# Patient Record
Sex: FEMALE | URBAN - METROPOLITAN AREA
[De-identification: names, ages, dates, MRNs, and addresses within clinical notes are randomized per-mention and may not be internally consistent; named-entity substitution may affect disease eponyms.]

---

## 2021-04-08 DIAGNOSIS — Z23 ENCOUNTER FOR IMMUNIZATION: ICD-10-CM

## 2023-01-31 ENCOUNTER — APPOINTMENT (RX ONLY)
Dept: URBAN - METROPOLITAN AREA CLINIC 94 | Facility: CLINIC | Age: 73
Setting detail: DERMATOLOGY
End: 2023-01-31

## 2023-01-31 DIAGNOSIS — Z86.007 PERSONAL HISTORY OF IN-SITU NEOPLASM OF SKIN: ICD-10-CM

## 2023-01-31 DIAGNOSIS — L57.0 ACTINIC KERATOSIS: ICD-10-CM

## 2023-01-31 PROCEDURE — ? COUNSELING

## 2023-01-31 PROCEDURE — 17000 DESTRUCT PREMALG LESION: CPT

## 2023-01-31 PROCEDURE — ? LIQUID NITROGEN

## 2023-01-31 PROCEDURE — 99212 OFFICE O/P EST SF 10 MIN: CPT | Mod: 25

## 2023-01-31 PROCEDURE — 17003 DESTRUCT PREMALG LES 2-14: CPT

## 2023-01-31 ASSESSMENT — LOCATION DETAILED DESCRIPTION DERM
LOCATION DETAILED: NASAL SUPRATIP
LOCATION DETAILED: LEFT ANTERIOR PROXIMAL UPPER ARM
LOCATION DETAILED: NASAL DORSUM

## 2023-01-31 ASSESSMENT — LOCATION ZONE DERM
LOCATION ZONE: ARM
LOCATION ZONE: NOSE

## 2023-01-31 ASSESSMENT — LOCATION SIMPLE DESCRIPTION DERM
LOCATION SIMPLE: LEFT UPPER ARM
LOCATION SIMPLE: NOSE

## 2023-01-31 NOTE — PROCEDURE: LIQUID NITROGEN
Number Of Freeze-Thaw Cycles: 2 freeze-thaw cycles
Render Post-Care Instructions In Note?: yes
Duration Of Freeze Thaw-Cycle (Seconds): 5
Post-Care Instructions: I reviewed with the patient in detail post-care instructions. Patient is to wear sunprotection, and avoid picking at any of the treated lesions. Pt may apply Vaseline to crusted or scabbing areas.
Consent: The patient's consent was obtained including but not limited to risks of crusting, scabbing, blistering, scarring, darker or lighter pigmentary change, recurrence, incomplete removal and infection.
Detail Level: Detailed
Render Note In Bullet Format When Appropriate: No

## 2025-05-01 ENCOUNTER — APPOINTMENT (OUTPATIENT)
Dept: URBAN - METROPOLITAN AREA CLINIC 94 | Facility: CLINIC | Age: 75
Setting detail: DERMATOLOGY
End: 2025-05-01

## 2025-05-01 DIAGNOSIS — L57.0 ACTINIC KERATOSIS: ICD-10-CM

## 2025-05-01 DIAGNOSIS — L82.0 INFLAMED SEBORRHEIC KERATOSIS: ICD-10-CM

## 2025-05-01 DIAGNOSIS — L85.3 XEROSIS CUTIS: ICD-10-CM

## 2025-05-01 DIAGNOSIS — L30.9 DERMATITIS, UNSPECIFIED: ICD-10-CM

## 2025-05-01 PROCEDURE — ? COUNSELING

## 2025-05-01 PROCEDURE — 17003 DESTRUCT PREMALG LES 2-14: CPT | Mod: 59

## 2025-05-01 PROCEDURE — 17110 DESTRUCTION B9 LES UP TO 14: CPT

## 2025-05-01 PROCEDURE — ? LIQUID NITROGEN

## 2025-05-01 PROCEDURE — ? PRESCRIPTION MEDICATION MANAGEMENT

## 2025-05-01 PROCEDURE — 99213 OFFICE O/P EST LOW 20 MIN: CPT | Mod: 25

## 2025-05-01 PROCEDURE — 17000 DESTRUCT PREMALG LESION: CPT | Mod: 59

## 2025-05-01 ASSESSMENT — LOCATION ZONE DERM
LOCATION ZONE: NOSE
LOCATION ZONE: LEG
LOCATION ZONE: FACE

## 2025-05-01 ASSESSMENT — LOCATION SIMPLE DESCRIPTION DERM
LOCATION SIMPLE: LEFT ANKLE
LOCATION SIMPLE: LEFT PRETIBIAL REGION
LOCATION SIMPLE: NOSE
LOCATION SIMPLE: LEFT CHEEK

## 2025-05-01 ASSESSMENT — LOCATION DETAILED DESCRIPTION DERM
LOCATION DETAILED: LEFT DISTAL PRETIBIAL REGION
LOCATION DETAILED: NASAL DORSUM
LOCATION DETAILED: NASAL SUPRATIP
LOCATION DETAILED: LEFT POSTERIOR ANKLE
LOCATION DETAILED: LEFT LATERAL MALAR CHEEK

## 2025-05-01 NOTE — PROCEDURE: LIQUID NITROGEN
Show Applicator Variable?: Yes
Consent: The patient's consent was obtained including but not limited to risks of crusting, scabbing, blistering, scarring, darker or lighter pigmentary change, recurrence, incomplete removal and infection.
Number Of Freeze-Thaw Cycles: 2 freeze-thaw cycles
Detail Level: Detailed
Post-Care Instructions: I reviewed with the patient in detail post-care instructions. Patient is to wear sunprotection, and avoid picking at any of the treated lesions. Pt may apply Vaseline to crusted or scabbing areas.
Render Note In Bullet Format When Appropriate: No
Duration Of Freeze Thaw-Cycle (Seconds): 5
Medical Necessity Information: It is in your best interest to select a reason for this procedure from the list below. All of these items fulfill various CMS LCD requirements except the new and changing color options.
Medical Necessity Clause: This procedure was medically necessary because the lesions that were treated were:
Spray Paint Text: The liquid nitrogen was applied to the skin utilizing a spray paint frosting technique.

## 2025-05-01 NOTE — PROCEDURE: PRESCRIPTION MEDICATION MANAGEMENT
Render In Strict Bullet Format?: No
Detail Level: Simple
Continue Regimen: Triamcinolone ointment (prescribed by another dr)\\nApply to a twice daily for two weeks on, one week off. Prn for flares

## 2025-05-18 PROBLEM — H35.3211 EXUDATIVE AGE-RELATED MACULAR DEGENERATION OF RIGHT EYE WITH ACTIVE CHOROIDAL NEOVASCULARIZATION (HCC): Status: ACTIVE | Noted: 2018-03-20

## 2025-05-18 RX ORDER — BUDESONIDE AND FORMOTEROL FUMARATE DIHYDRATE 160; 4.5 UG/1; UG/1
2 AEROSOL RESPIRATORY (INHALATION) 2 TIMES DAILY
COMMUNITY

## 2025-05-18 RX ORDER — LANSOPRAZOLE 30 MG/1
CAPSULE, DELAYED RELEASE ORAL
COMMUNITY
Start: 2025-05-07

## 2025-05-18 RX ORDER — ALPRAZOLAM 0.25 MG
0.25 TABLET ORAL 3 TIMES DAILY PRN
COMMUNITY
Start: 2025-01-15

## 2025-05-18 RX ORDER — FELODIPINE 5 MG/1
5 TABLET, EXTENDED RELEASE ORAL EVERY MORNING
COMMUNITY
Start: 2025-01-30

## 2025-05-18 RX ORDER — ALBUTEROL SULFATE 5 MG/ML
SOLUTION RESPIRATORY (INHALATION)
COMMUNITY
End: 2025-05-24

## 2025-05-18 RX ORDER — LOSARTAN POTASSIUM 50 MG/1
50 TABLET ORAL EVERY MORNING
COMMUNITY
Start: 2025-01-30

## 2025-05-18 RX ORDER — ALBUTEROL SULFATE 0.83 MG/ML
2.5 SOLUTION RESPIRATORY (INHALATION) 3 TIMES DAILY
COMMUNITY

## 2025-05-18 RX ORDER — ALBUTEROL SULFATE 90 UG/1
2 INHALANT RESPIRATORY (INHALATION)
COMMUNITY

## 2025-05-19 ENCOUNTER — OFFICE VISIT (OUTPATIENT)
Dept: INTERNAL MEDICINE CLINIC | Facility: CLINIC | Age: 75
End: 2025-05-19
Payer: MEDICARE

## 2025-05-19 VITALS
HEART RATE: 83 BPM | OXYGEN SATURATION: 96 % | DIASTOLIC BLOOD PRESSURE: 76 MMHG | TEMPERATURE: 98.7 F | WEIGHT: 153 LBS | HEIGHT: 62 IN | SYSTOLIC BLOOD PRESSURE: 122 MMHG | BODY MASS INDEX: 28.16 KG/M2

## 2025-05-19 DIAGNOSIS — U09.9 POST-COVID CHRONIC SHORTNESS OF BREATH: ICD-10-CM

## 2025-05-19 DIAGNOSIS — R06.02 POST-COVID CHRONIC SHORTNESS OF BREATH: ICD-10-CM

## 2025-05-19 DIAGNOSIS — F32.A ANXIETY AND DEPRESSION: ICD-10-CM

## 2025-05-19 DIAGNOSIS — E78.5 DYSLIPIDEMIA, GOAL LDL BELOW 130: ICD-10-CM

## 2025-05-19 DIAGNOSIS — Z12.11 COLON CANCER SCREENING: ICD-10-CM

## 2025-05-19 DIAGNOSIS — J45.40 MODERATE PERSISTENT ASTHMA WITHOUT COMPLICATION: Chronic | ICD-10-CM

## 2025-05-19 DIAGNOSIS — I10 HTN, GOAL BELOW 140/90: ICD-10-CM

## 2025-05-19 DIAGNOSIS — Z00.00 MEDICARE ANNUAL WELLNESS VISIT, SUBSEQUENT: Primary | ICD-10-CM

## 2025-05-19 DIAGNOSIS — H35.3211 EXUDATIVE AGE-RELATED MACULAR DEGENERATION OF RIGHT EYE WITH ACTIVE CHOROIDAL NEOVASCULARIZATION (HCC): ICD-10-CM

## 2025-05-19 DIAGNOSIS — R06.09 DOE (DYSPNEA ON EXERTION): ICD-10-CM

## 2025-05-19 DIAGNOSIS — G25.81 RESTLESS LEG: ICD-10-CM

## 2025-05-19 DIAGNOSIS — Z78.0 POST-MENOPAUSAL: ICD-10-CM

## 2025-05-19 DIAGNOSIS — R79.9 ABNORMAL FINDING OF BLOOD CHEMISTRY, UNSPECIFIED: ICD-10-CM

## 2025-05-19 DIAGNOSIS — Z13.1 DIABETES MELLITUS SCREENING: ICD-10-CM

## 2025-05-19 DIAGNOSIS — F41.9 ANXIETY AND DEPRESSION: ICD-10-CM

## 2025-05-19 PROCEDURE — G0439 PPPS, SUBSEQ VISIT: HCPCS | Performed by: INTERNAL MEDICINE

## 2025-05-19 PROCEDURE — 99204 OFFICE O/P NEW MOD 45 MIN: CPT | Performed by: INTERNAL MEDICINE

## 2025-05-19 RX ORDER — GABAPENTIN 100 MG/1
100 CAPSULE ORAL
Qty: 30 CAPSULE | Refills: 5 | Status: SHIPPED | OUTPATIENT
Start: 2025-05-19

## 2025-05-19 NOTE — PROGRESS NOTES
Name: Bre Staton      : 1950      MRN: [id number]  Encounter Provider: Braden Bernal MD  Encounter Date: 2025   Encounter department: [facility] INTERNAL MEDICINE [place]  :  Assessment & Plan  Medicare annual wellness visit, subsequent  Completed.       HTN, goal below 140/90    HTN - losartan, felodipine, bp stable, - pt denies CP, SOB, new onset of HA, vision change, dizziness, fever, chills     Orders:  •  CBC and differential; Future  •  Comprehensive metabolic panel; Future  •  TSH, 3rd generation with Free T4 reflex; Future    Dyslipidemia, goal LDL below 130  Dyslipidemia - pt declined medications this time, discussed risk         Orders:  •  Lipid Panel with Direct LDL reflex; Future    Moderate persistent asthma without complication  Asthma - albuterol, symbicort, use less than 1x/wk     Orders:  •  Ambulatory Referral to Pulmonology; Future    Exudative age-related macular degeneration of right eye with active choroidal neovascularization (HCC)  Macular degeneration - declining vision, follows retinal specialist          Post-COVID chronic shortness of breath  She had COVID , wasn't hospitalized, struggling with sob, walking from one room to the other, before COVID, she was walking her dog, her legs hurt her and her fingertips.  Orders:  •  CBC and differential; Future  •  Comprehensive metabolic panel; Future  •  TSH, 3rd generation with Free T4 reflex; Future  •  Complete PFT with post bronchodilator; Future  •  XR chest pa and lateral; Future  •  Ambulatory Referral to Pulmonology; Future    Diabetes mellitus screening  Check A1c.  Orders:  •  Hemoglobin A1C; Future    VILLAVICENCIO (dyspnea on exertion)  She had COVID , wasn't hospitalized, struggling with sob, walking from one room to the other, before COVID, denies cp, sob, palpitations. Heart exam RRR  Orders:  •  Echo complete w/ contrast if indicated; Future  •  XR chest pa and lateral; Future    Restless leg  Episodic, started a  month ago, not a skylar horse, just achy, constantly having to move it  Orders:  •  Iron Panel (Includes Ferritin, Iron Sat%, Iron, and TIBC); Future  •  gabapentin (NEURONTIN) 100 mg capsule; Take 1 capsule (100 mg total) by mouth daily at bedtime    Abnormal finding of blood chemistry, unspecified    Orders:  •  Iron Panel (Includes Ferritin, Iron Sat%, Iron, and TIBC); Future    Anxiety and depression  Depression Screening Follow-up Plan: Patient's depression screening was positive with a PHQ-2 score of 4. Their PHQ-9 score was 16. Patient assessed for underlying major depression. They have no active suicidal ideations. Brief counseling provided and recommend additional follow-up/re-evaluation next office visit.  Taking xanax as needed. Cannot take SSRI 2/2 eye disease.       Colon cancer screening  Last colonoscopy in 2015, normal.  Orders:  •  Cologuard    Post-menopausal    Orders:  •  DXA bone density spine hip and pelvis; Future      Depression Screening and Follow-up Plan: Patient's depression screening was positive with a PHQ-2 score of 4. Their PHQ-9 score was 16.   Clincally patient does not have depression. No treatment is required.     Preventive health issues were discussed with patient, and age appropriate screening tests were ordered as noted in patient's After Visit Summary. Personalized health advice and appropriate referrals for health education or preventive services given if needed, as noted in patient's After Visit Summary.    History of Present Illness     Here to establish care.     Patient Care Team:  Braden Bernal MD as PCP - General (Internal Medicine)    Review of Systems   Constitutional:  Negative for chills and fever.   HENT:  Negative for ear pain and sore throat.    Eyes:  Negative for pain and visual disturbance.   Respiratory:  Positive for shortness of breath. Negative for cough.    Cardiovascular:  Negative for chest pain and palpitations.   Gastrointestinal:  Negative for  abdominal pain and vomiting.   Genitourinary:  Negative for dysuria and hematuria.   Musculoskeletal:  Negative for arthralgias and back pain.   Skin:  Negative for color change and rash.   Neurological:  Negative for seizures and syncope.   All other systems reviewed and are negative.    Medical History Reviewed by provider this encounter:  Tobacco  Allergies  Meds  Problems  Med Hx  Surg Hx  Fam Hx       Annual Wellness Visit Questionnaire   Bre is here for her Subsequent Wellness visit. Last Medicare Wellness visit information reviewed, patient interviewed and updates made to the record today.      Health Risk Assessment:   Patient rates overall health as poor. Patient feels that their physical health rating is slightly worse. Patient is satisfied with their life. Eyesight was rated as much worse. Hearing was rated as same. Patient feels that their emotional and mental health rating is slightly worse. Patients states they are sometimes angry. Patient states they are often unusually tired/fatigued. Pain experienced in the last 7 days has been none. Patient states that she has experienced no weight loss or gain in last 6 months.     Depression Screening:   PHQ-2 Score: 4  PHQ-9 Score: 16      Fall Risk Screening:   In the past year, patient has experienced: no history of falling in past year      Urinary Incontinence Screening:   Patient has not leaked urine accidently in the last six months.     Home Safety:  Patient has trouble with stairs inside or outside of their home. Patient has working smoke alarms and has working carbon monoxide detector. Home safety hazards include: none.     Nutrition:   Current diet is Regular.     Medications:   Patient is not currently taking any over-the-counter supplements. Patient is able to manage medications.     Activities of Daily Living (ADLs)/Instrumental Activities of Daily Living (IADLs):   Walk and transfer into and out of bed and chair?: Yes  Dress and groom  yourself?: Yes    Bathe or shower yourself?: Yes    Feed yourself? Yes  Do your laundry/housekeeping?: Yes  Manage your money, pay your bills and track your expenses?: Yes  Make your own meals?: No    Do your own shopping?: No    Previous Hospitalizations:   Any hospitalizations or ED visits within the last 12 months?: No      Advance Care Planning:   Living will: Yes    Durable POA for healthcare: Yes    Advanced directive: Yes      Cognitive Screening:   Provider or family/friend/caregiver concerned regarding cognition?: No    Preventive Screenings      Cardiovascular Screening:    General: History Lipid Disorder and Risks and Benefits Discussed    Due for: Lipid Panel      Diabetes Screening:     General: Risks and Benefits Discussed    Due for: Blood Glucose      Colorectal Cancer Screening:     General: Risks and Benefits Discussed    Due for: Colonoscopy - Low Risk      Breast Cancer Screening:     General: Screening Current      Cervical Cancer Screening:    General: Screening Not Indicated      Osteoporosis Screening:    General: Risks and Benefits Discussed    Due for: DXA Axial      Abdominal Aortic Aneurysm (AAA) Screening:        General: Screening Not Indicated      Lung Cancer Screening:     General: Screening Not Indicated      Hepatitis C Screening:    General: Patient Declines    Screening, Brief Intervention, and Referral to Treatment (SBIRT)     Screening  Typical number of drinks in a day: 2  Typical number of drinks in a week: 14  Interpretation: Low risk drinking behavior.    Single Item Drug Screening:  How often have you used an illegal drug (including marijuana) or a prescription medication for non-medical reasons in the past year? never    Single Item Drug Screen Score: 0  Interpretation: Negative screen for possible drug use disorder    Brief Intervention  Alcohol & drug use screenings were reviewed. No concerns regarding substance use disorder identified.     Other Counseling Topics:  "  Car/seat belt/driving safety.     Social Drivers of Health     Food Insecurity: No Food Insecurity (5/19/2025)    Nursing - Inadequate Food Risk Classification    • Worried About Running Out of Food in the Last Year: Never true    • Ran Out of Food in the Last Year: Never true   Transportation Needs: No Transportation Needs (5/19/2025)    PRAPARE - Transportation    • Lack of Transportation (Medical): No    • Lack of Transportation (Non-Medical): No   Housing Stability: Low Risk  (5/19/2025)    Housing Stability Vital Sign    • Unable to Pay for Housing in the Last Year: No    • Number of Times Moved in the Last Year: 0    • Homeless in the Last Year: No   Utilities: Not At Risk (5/19/2025)    [facility] Utilities    • Threatened with loss of utilities: No     No results found.    Objective   /76 (BP Location: Left arm, Patient Position: Sitting, Cuff Size: Standard)   Pulse 83   Temp 98.7 °F (37.1 °C) (Temporal)   Ht 5' 2\" (1.575 m)   Wt 69.4 kg (153 lb)   SpO2 96%   BMI 27.98 kg/m²     Physical Exam  Vitals and nursing note reviewed.   Constitutional:       General: She is not in acute distress.     Appearance: Normal appearance. She is well-developed.   HENT:      Head: Normocephalic and atraumatic.     Cardiovascular:      Rate and Rhythm: Normal rate and regular rhythm.      Heart sounds: No murmur heard.  Pulmonary:      Effort: Pulmonary effort is normal. No respiratory distress.      Breath sounds: Normal breath sounds.   Abdominal:      General: Abdomen is flat.      Palpations: Abdomen is soft.      Tenderness: There is no abdominal tenderness.     Musculoskeletal:         General: No swelling.   Lymphadenopathy:      Cervical: No cervical adenopathy.     Skin:     General: Skin is warm and dry.      Capillary Refill: Capillary refill takes less than 2 seconds.     Neurological:      General: No focal deficit present.      Mental Status: She is alert and oriented to person, place, and time. Mental " status is at baseline.     Psychiatric:         Mood and Affect: Mood normal.

## 2025-05-19 NOTE — ASSESSMENT & PLAN NOTE
HTN - losartan, felodipine, bp stable, - pt denies CP, SOB, new onset of HA, vision change, dizziness, fever, chills     Orders:    CBC and differential; Future    Comprehensive metabolic panel; Future    TSH, 3rd generation with Free T4 reflex; Future

## 2025-05-19 NOTE — PATIENT INSTRUCTIONS
Medicare Preventive Visit Patient Instructions  Thank you for completing your Welcome to Medicare Visit or Medicare Annual Wellness Visit today. Your next wellness visit will be due in one year (5/20/2026).  The screening/preventive services that you may require over the next 5-10 years are detailed below. Some tests may not apply to you based off risk factors and/or age. Screening tests ordered at today's visit but not completed yet may show as past due. Also, please note that scanned in results may not display below.  Preventive Screenings:  Service Recommendations Previous Testing/Comments   Colorectal Cancer Screening  * Colonoscopy    * Fecal Occult Blood Test (FOBT)/Fecal Immunochemical Test (FIT)  * Fecal DNA/Cologuard Test  * Flexible Sigmoidoscopy Age: 45-75 years old   Colonoscopy: every 10 years (may be performed more frequently if at higher risk)  OR  FOBT/FIT: every 1 year  OR  Cologuard: every 3 years  OR  Sigmoidoscopy: every 5 years  Screening may be recommended earlier than age 45 if at higher risk for colorectal cancer. Also, an individualized decision between you and your healthcare provider will decide whether screening between the ages of 76-85 would be appropriate. Colonoscopy: Not on file  FOBT/FIT: Not on file  Cologuard: Not on file  Sigmoidoscopy: Not on file          Breast Cancer Screening Age: 40+ years old  Frequency: every 1-2 years  Not required if history of left and right mastectomy Mammogram: 10/14/2024    Screening Current   Cervical Cancer Screening Between the ages of 21-29, pap smear recommended once every 3 years.   Between the ages of 30-65, can perform pap smear with HPV co-testing every 5 years.   Recommendations may differ for women with a history of total hysterectomy, cervical cancer, or abnormal pap smears in past. Pap Smear: Not on file    Screening Not Indicated   Hepatitis C Screening Once for adults born between 1945 and 1965  More frequently in patients at high  risk for Hepatitis C Hep C Antibody: Not on file        Diabetes Screening 1-2 times per year if you're at risk for diabetes or have pre-diabetes Fasting glucose: No results in last 5 years (No results in last 5 years)  A1C: No results in last 5 years (No results in last 5 years)  Screening Current   Cholesterol Screening Once every 5 years if you don't have a lipid disorder. May order more often based on risk factors. Lipid panel: 06/16/2020    Screening Not Indicated  History Lipid Disorder     Other Preventive Screenings Covered by Medicare:  Abdominal Aortic Aneurysm (AAA) Screening: covered once if your at risk. You're considered to be at risk if you have a family history of AAA.  Lung Cancer Screening: covers low dose CT scan once per year if you meet all of the following conditions: (1) Age 55-77; (2) No signs or symptoms of lung cancer; (3) Current smoker or have quit smoking within the last 15 years; (4) You have a tobacco smoking history of at least 20 pack years (packs per day multiplied by number of years you smoked); (5) You get a written order from a healthcare provider.  Glaucoma Screening: covered annually if you're considered high risk: (1) You have diabetes OR (2) Family history of glaucoma OR (3)  aged 50 and older OR (4)  American aged 65 and older  Osteoporosis Screening: covered every 2 years if you meet one of the following conditions: (1) You're estrogen deficient and at risk for osteoporosis based off medical history and other findings; (2) Have a vertebral abnormality; (3) On glucocorticoid therapy for more than 3 months; (4) Have primary hyperparathyroidism; (5) On osteoporosis medications and need to assess response to drug therapy.   Last bone density test (DXA Scan): 10/08/2017.  HIV Screening: covered annually if you're between the age of 15-65. Also covered annually if you are younger than 15 and older than 65 with risk factors for HIV infection. For pregnant  patients, it is covered up to 3 times per pregnancy.    Immunizations:  Immunization Recommendations   Influenza Vaccine Annual influenza vaccination during flu season is recommended for all persons aged >= 6 months who do not have contraindications   Pneumococcal Vaccine   * Pneumococcal conjugate vaccine = PCV13 (Prevnar 13), PCV15 (Vaxneuvance), PCV20 (Prevnar 20)  * Pneumococcal polysaccharide vaccine = PPSV23 (Pneumovax) Adults 19-63 yo with certain risk factors or if 65+ yo  If never received any pneumonia vaccine: recommend Prevnar 20 (PCV20)  Give PCV20 if previously received 1 dose of PCV13 or PPSV23   Hepatitis B Vaccine 3 dose series if at intermediate or high risk (ex: diabetes, end stage renal disease, liver disease)   Respiratory syncytial virus (RSV) Vaccine - COVERED BY MEDICARE PART D  * RSVPreF3 (Arexvy) CDC recommends that adults 60 years of age and older may receive a single dose of RSV vaccine using shared clinical decision-making (SCDM)   Tetanus (Td) Vaccine - COST NOT COVERED BY MEDICARE PART B Following completion of primary series, a booster dose should be given every 10 years to maintain immunity against tetanus. Td may also be given as tetanus wound prophylaxis.   Tdap Vaccine - COST NOT COVERED BY MEDICARE PART B Recommended at least once for all adults. For pregnant patients, recommended with each pregnancy.   Shingles Vaccine (Shingrix) - COST NOT COVERED BY MEDICARE PART B  2 shot series recommended in those 19 years and older who have or will have weakened immune systems or those 50 years and older     Health Maintenance Due:      Topic Date Due   • Hepatitis C Screening  Never done   • Colorectal Cancer Screening  Never done   • Breast Cancer Screening: Mammogram  10/14/2025     Immunizations Due:      Topic Date Due   • COVID-19 Vaccine (8 - 2024-25 season) 03/19/2025     Advance Directives   What are advance directives?  Advance directives are legal documents that state your  wishes and plans for medical care. These plans are made ahead of time in case you lose your ability to make decisions for yourself. Advance directives can apply to any medical decision, such as the treatments you want, and if you want to donate organs.   What are the types of advance directives?  There are many types of advance directives, and each state has rules about how to use them. You may choose a combination of any of the following:  Living will:  This is a written record of the treatment you want. You can also choose which treatments you do not want, which to limit, and which to stop at a certain time. This includes surgery, medicine, IV fluid, and tube feedings.   Durable power of  for healthcare (DPAHC):  This is a written record that states who you want to make healthcare choices for you when you are unable to make them for yourself. This person, called a proxy, is usually a family member or a friend. You may choose more than 1 proxy.  Do not resuscitate (DNR) order:  A DNR order is used in case your heart stops beating or you stop breathing. It is a request not to have certain forms of treatment, such as CPR. A DNR order may be included in other types of advance directives.  Medical directive:  This covers the care that you want if you are in a coma, near death, or unable to make decisions for yourself. You can list the treatments you want for each condition. Treatment may include pain medicine, surgery, blood transfusions, dialysis, IV or tube feedings, and a ventilator (breathing machine).  Values history:  This document has questions about your views, beliefs, and how you feel and think about life. This information can help others choose the care that you would choose.  Why are advance directives important?  An advance directive helps you control your care. Although spoken wishes may be used, it is better to have your wishes written down. Spoken wishes can be misunderstood, or not followed.  Treatments may be given even if you do not want them. An advance directive may make it easier for your family to make difficult choices about your care.   Depression   Depression  is a medical condition that causes feelings of sadness or hopelessness that do not go away. Depression may cause you to lose interest in things you used to enjoy. These feelings may interfere with your daily life.  Call your local emergency number (911 in the ) if:   You think about harming yourself or someone else.  You have done something on purpose to hurt yourself.  The following resources are available at any time to help you, if needed:   National Suicide Prevention Lifeline: 1-620.931.7659 (0-605-117-TALK)   Suicide Hotline: 1-258.407.7096 (5-371-ZQISHQI)   For a list of international numbers: https://save.org/find-help/international-resources/  Treatment for depression may include medicine to relieve depression. Medicine is often used together with therapy. Therapy is a way for you to talk about your feelings and anything that may be causing depression. Therapy can be done alone or in a group. It may also be done with family members or a significant other.  Get regular physical activity.    Create a regular sleep schedule.    Eat a variety of healthy foods.    Do not drink alcohol or use drugs.     Weight Management   Why it is important to manage your weight:  Being overweight increases your risk of health conditions such as heart disease, high blood pressure, type 2 diabetes, and certain types of cancer. It can also increase your risk for osteoarthritis, sleep apnea, and other respiratory problems. Aim for a slow, steady weight loss. Even a small amount of weight loss can lower your risk of health problems.  How to lose weight safely:  A safe and healthy way to lose weight is to eat fewer calories and get regular exercise. You can lose up about 1 pound a week by decreasing the number of calories you eat by 500 calories each  day.   Healthy meal plan for weight management:  A healthy meal plan includes a variety of foods, contains fewer calories, and helps you stay healthy. A healthy meal plan includes the following:  Eat whole-grain foods more often.  A healthy meal plan should contain fiber. Fiber is the part of grains, fruits, and vegetables that is not broken down by your body. Whole-grain foods are healthy and provide extra fiber in your diet. Some examples of whole-grain foods are whole-wheat breads and pastas, oatmeal, brown rice, and bulgur.  Eat a variety of vegetables every day.  Include dark, leafy greens such as spinach, kale, preet greens, and mustard greens. Eat yellow and orange vegetables such as carrots, sweet potatoes, and winter squash.   Eat a variety of fruits every day.  Choose fresh or canned fruit (canned in its own juice or light syrup) instead of juice. Fruit juice has very little or no fiber.  Eat low-fat dairy foods.  Drink fat-free (skim) milk or 1% milk. Eat fat-free yogurt and low-fat cottage cheese. Try low-fat cheeses such as mozzarella and other reduced-fat cheeses.  Choose meat and other protein foods that are low in fat.  Choose beans or other legumes such as split peas or lentils. Choose fish, skinless poultry (chicken or turkey), or lean cuts of red meat (beef or pork). Before you cook meat or poultry, cut off any visible fat.   Use less fat and oil.  Try baking foods instead of frying them. Add less fat, such as margarine, sour cream, regular salad dressing and mayonnaise to foods. Eat fewer high-fat foods. Some examples of high-fat foods include french fries, doughnuts, ice cream, and cakes.  Eat fewer sweets.  Limit foods and drinks that are high in sugar. This includes candy, cookies, regular soda, and sweetened drinks.  Exercise:  Exercise at least 30 minutes per day on most days of the week. Some examples of exercise include walking, biking, dancing, and swimming. You can also fit in more  physical activity by taking the stairs instead of the elevator or parking farther away from stores. Ask your healthcare provider about the best exercise plan for you.    © Copyright [employer] 2018 Information is for End User's use only and may not be sold, redistributed or otherwise used for commercial purposes. All illustrations and images included in CareNotes® are the copyrighted property of [employer] or [employer]

## 2025-05-19 NOTE — ASSESSMENT & PLAN NOTE
Dyslipidemia - pt declined medications this time, discussed risk         Orders:    Lipid Panel with Direct LDL reflex; Future

## 2025-05-19 NOTE — ASSESSMENT & PLAN NOTE
Asthma - albuterol, symbicort, use less than 1x/wk     Orders:    Ambulatory Referral to Pulmonology; Future

## 2025-05-21 ENCOUNTER — RESULTS FOLLOW-UP (OUTPATIENT)
Dept: INTERNAL MEDICINE CLINIC | Facility: CLINIC | Age: 75
End: 2025-05-21

## 2025-05-21 ENCOUNTER — APPOINTMENT (INPATIENT)
Dept: CT IMAGING | Facility: HOSPITAL | Age: 75
DRG: 812 | End: 2025-05-21
Payer: MEDICARE

## 2025-05-21 ENCOUNTER — HOSPITAL ENCOUNTER (OUTPATIENT)
Dept: RADIOLOGY | Facility: HOSPITAL | Age: 75
Discharge: HOME | DRG: 812 | End: 2025-05-21
Payer: MEDICARE

## 2025-05-21 ENCOUNTER — HOSPITAL ENCOUNTER (INPATIENT)
Facility: HOSPITAL | Age: 75
LOS: 3 days | Discharge: HOME | DRG: 812 | End: 2025-05-24
Payer: MEDICARE

## 2025-05-21 ENCOUNTER — APPOINTMENT (OUTPATIENT)
Dept: LAB | Facility: HOSPITAL | Age: 75
DRG: 812 | End: 2025-05-21
Payer: MEDICARE

## 2025-05-21 DIAGNOSIS — U09.9 POST-COVID CHRONIC SHORTNESS OF BREATH: ICD-10-CM

## 2025-05-21 DIAGNOSIS — Z13.1 DIABETES MELLITUS SCREENING: ICD-10-CM

## 2025-05-21 DIAGNOSIS — G25.81 RESTLESS LEG: ICD-10-CM

## 2025-05-21 DIAGNOSIS — R06.02 POST-COVID CHRONIC SHORTNESS OF BREATH: ICD-10-CM

## 2025-05-21 DIAGNOSIS — R79.9 ABNORMAL FINDING OF BLOOD CHEMISTRY, UNSPECIFIED: ICD-10-CM

## 2025-05-21 DIAGNOSIS — E78.5 DYSLIPIDEMIA, GOAL LDL BELOW 130: ICD-10-CM

## 2025-05-21 DIAGNOSIS — D64.9 ANEMIA: Primary | ICD-10-CM

## 2025-05-21 DIAGNOSIS — R06.09 DOE (DYSPNEA ON EXERTION): ICD-10-CM

## 2025-05-21 DIAGNOSIS — I10 HTN, GOAL BELOW 140/90: ICD-10-CM

## 2025-05-21 PROBLEM — D75.839 THROMBOCYTOSIS: Status: ACTIVE | Noted: 2025-05-21

## 2025-05-21 PROBLEM — E87.1 HYPONATREMIA: Status: ACTIVE | Noted: 2025-05-21

## 2025-05-21 LAB
2HR DELTA HS TROPONIN: 0 NG/L
4HR DELTA HS TROPONIN: 9 NG/L
ABO GROUP BLD: NORMAL
ABO GROUP BLD: NORMAL
ALBUMIN SERPL BCG-MCNC: 4 G/DL (ref 3.5–5)
ALBUMIN SERPL BCG-MCNC: 4.2 G/DL (ref 3.5–5)
ALP SERPL-CCNC: 84 U/L (ref 34–104)
ALP SERPL-CCNC: 91 U/L (ref 34–104)
ALT SERPL W P-5'-P-CCNC: 16 U/L (ref 7–52)
ALT SERPL W P-5'-P-CCNC: 17 U/L (ref 7–52)
ANION GAP SERPL CALCULATED.3IONS-SCNC: 8 MMOL/L (ref 4–13)
ANION GAP SERPL CALCULATED.3IONS-SCNC: 9 MMOL/L (ref 4–13)
APTT PPP: 26 SECONDS (ref 23–34)
AST SERPL W P-5'-P-CCNC: 19 U/L (ref 13–39)
AST SERPL W P-5'-P-CCNC: 19 U/L (ref 13–39)
BACTERIA UR QL AUTO: NORMAL /HPF
BASOPHILS # BLD AUTO: 0.07 THOUSANDS/ÂΜL (ref 0–0.1)
BASOPHILS # BLD AUTO: 0.09 THOUSANDS/ÂΜL (ref 0–0.1)
BASOPHILS NFR BLD AUTO: 1 % (ref 0–1)
BASOPHILS NFR BLD AUTO: 1 % (ref 0–1)
BILIRUB SERPL-MCNC: 0.58 MG/DL (ref 0.2–1)
BILIRUB SERPL-MCNC: 0.62 MG/DL (ref 0.2–1)
BILIRUB UR QL STRIP: NEGATIVE
BLD GP AB SCN SERPL QL: NEGATIVE
BUN SERPL-MCNC: 7 MG/DL (ref 5–25)
BUN SERPL-MCNC: 7 MG/DL (ref 5–25)
CALCIUM SERPL-MCNC: 8.8 MG/DL (ref 8.4–10.2)
CALCIUM SERPL-MCNC: 9.1 MG/DL (ref 8.4–10.2)
CARDIAC TROPONIN I PNL SERPL HS: 15 NG/L (ref ?–50)
CARDIAC TROPONIN I PNL SERPL HS: 6 NG/L (ref ?–50)
CARDIAC TROPONIN I PNL SERPL HS: 6 NG/L (ref ?–50)
CHLORIDE SERPL-SCNC: 99 MMOL/L (ref 96–108)
CHLORIDE SERPL-SCNC: 99 MMOL/L (ref 96–108)
CHOLEST SERPL-MCNC: 170 MG/DL (ref ?–200)
CLARITY UR: CLEAR
CO2 SERPL-SCNC: 22 MMOL/L (ref 21–32)
CO2 SERPL-SCNC: 22 MMOL/L (ref 21–32)
COLOR UR: YELLOW
CREAT SERPL-MCNC: 0.43 MG/DL (ref 0.6–1.3)
CREAT SERPL-MCNC: 0.45 MG/DL (ref 0.6–1.3)
EOSINOPHIL # BLD AUTO: 0.2 THOUSAND/ÂΜL (ref 0–0.61)
EOSINOPHIL # BLD AUTO: 0.23 THOUSAND/ÂΜL (ref 0–0.61)
EOSINOPHIL NFR BLD AUTO: 2 % (ref 0–6)
EOSINOPHIL NFR BLD AUTO: 2 % (ref 0–6)
ERYTHROCYTE [DISTWIDTH] IN BLOOD BY AUTOMATED COUNT: 19.1 % (ref 11.6–15.1)
ERYTHROCYTE [DISTWIDTH] IN BLOOD BY AUTOMATED COUNT: 19.3 % (ref 11.6–15.1)
FERRITIN SERPL-MCNC: 7 NG/ML (ref 30–307)
GFR SERPL CREATININE-BSD FRML MDRD: 101 ML/MIN/1.73SQ M
GFR SERPL CREATININE-BSD FRML MDRD: 99 ML/MIN/1.73SQ M
GLUCOSE P FAST SERPL-MCNC: 99 MG/DL (ref 65–99)
GLUCOSE SERPL-MCNC: 103 MG/DL (ref 65–140)
GLUCOSE UR STRIP-MCNC: NEGATIVE MG/DL
HCT VFR BLD AUTO: 16.6 % (ref 34.8–46.1)
HCT VFR BLD AUTO: 17.8 % (ref 34.8–46.1)
HDLC SERPL-MCNC: 70 MG/DL
HGB BLD-MCNC: 4.7 G/DL (ref 11.5–15.4)
HGB BLD-MCNC: 4.9 G/DL (ref 11.5–15.4)
HGB BLD-MCNC: 7.8 G/DL (ref 11.5–15.4)
HGB UR QL STRIP.AUTO: NEGATIVE
IMM GRANULOCYTES # BLD AUTO: 0.06 THOUSAND/UL (ref 0–0.2)
IMM GRANULOCYTES # BLD AUTO: 0.06 THOUSAND/UL (ref 0–0.2)
IMM GRANULOCYTES NFR BLD AUTO: 1 % (ref 0–2)
IMM GRANULOCYTES NFR BLD AUTO: 1 % (ref 0–2)
INR PPP: 1.1 (ref 0.85–1.19)
IRON SERPL-MCNC: <10 UG/DL (ref 50–212)
KETONES UR STRIP-MCNC: NEGATIVE MG/DL
LACTATE SERPL-SCNC: 1.5 MMOL/L (ref 0.5–2)
LDH SERPL-CCNC: 106 U/L (ref 140–271)
LDLC SERPL CALC-MCNC: 85 MG/DL (ref 0–100)
LEUKOCYTE ESTERASE UR QL STRIP: ABNORMAL
LYMPHOCYTES # BLD AUTO: 1.07 THOUSANDS/ÂΜL (ref 0.6–4.47)
LYMPHOCYTES # BLD AUTO: 1.52 THOUSANDS/ÂΜL (ref 0.6–4.47)
LYMPHOCYTES NFR BLD AUTO: 11 % (ref 14–44)
LYMPHOCYTES NFR BLD AUTO: 15 % (ref 14–44)
MAGNESIUM SERPL-MCNC: 2 MG/DL (ref 1.9–2.7)
MCH RBC QN AUTO: 19.7 PG (ref 26.8–34.3)
MCH RBC QN AUTO: 19.8 PG (ref 26.8–34.3)
MCHC RBC AUTO-ENTMCNC: 26.7 G/DL (ref 31.4–37.4)
MCHC RBC AUTO-ENTMCNC: 27 G/DL (ref 31.4–37.4)
MCV RBC AUTO: 74 FL (ref 82–98)
MCV RBC AUTO: 74 FL (ref 82–98)
MONOCYTES # BLD AUTO: 1.03 THOUSAND/ÂΜL (ref 0.17–1.22)
MONOCYTES # BLD AUTO: 1.15 THOUSAND/ÂΜL (ref 0.17–1.22)
MONOCYTES NFR BLD AUTO: 11 % (ref 4–12)
MONOCYTES NFR BLD AUTO: 11 % (ref 4–12)
NEUTROPHILS # BLD AUTO: 6.92 THOUSANDS/ÂΜL (ref 1.85–7.62)
NEUTROPHILS # BLD AUTO: 7.34 THOUSANDS/ÂΜL (ref 1.85–7.62)
NEUTS SEG NFR BLD AUTO: 70 % (ref 43–75)
NEUTS SEG NFR BLD AUTO: 74 % (ref 43–75)
NITRITE UR QL STRIP: NEGATIVE
NON-SQ EPI CELLS URNS QL MICRO: NORMAL /HPF
NRBC BLD AUTO-RTO: 0 /100 WBCS
NRBC BLD AUTO-RTO: 0 /100 WBCS
PH UR STRIP.AUTO: 6 [PH]
PLATELET # BLD AUTO: 486 THOUSANDS/UL (ref 149–390)
PLATELET # BLD AUTO: 501 THOUSANDS/UL (ref 149–390)
PMV BLD AUTO: 8.7 FL (ref 8.9–12.7)
PMV BLD AUTO: 9.1 FL (ref 8.9–12.7)
POTASSIUM SERPL-SCNC: 3.7 MMOL/L (ref 3.5–5.3)
POTASSIUM SERPL-SCNC: 4 MMOL/L (ref 3.5–5.3)
PROT SERPL-MCNC: 6.6 G/DL (ref 6.4–8.4)
PROT SERPL-MCNC: 7 G/DL (ref 6.4–8.4)
PROT UR STRIP-MCNC: NEGATIVE MG/DL
PROTHROMBIN TIME: 14.9 SECONDS (ref 12.3–15)
RBC # BLD AUTO: 2.33 MILLION/UL (ref 3.81–5.12)
RBC # BLD AUTO: 2.42 MILLION/UL (ref 3.81–5.12)
RBC #/AREA URNS AUTO: NORMAL /HPF
RETICS # AUTO: ABNORMAL 10*3/UL (ref 14097–95744)
RETICS # CALC: 3.23 % (ref 0.37–1.87)
RH BLD: NEGATIVE
RH BLD: NEGATIVE
SODIUM SERPL-SCNC: 129 MMOL/L (ref 135–147)
SODIUM SERPL-SCNC: 130 MMOL/L (ref 135–147)
SP GR UR STRIP.AUTO: <=1.005 (ref 1–1.03)
SPECIMEN EXPIRATION DATE: NORMAL
TIBC SERPL-MCNC: 567 UG/DL (ref 250–450)
TRANSFERRIN SERPL-MCNC: 405 MG/DL (ref 203–362)
TRIGL SERPL-MCNC: 73 MG/DL (ref ?–150)
TSH SERPL DL<=0.05 MIU/L-ACNC: 3.34 UIU/ML (ref 0.45–4.5)
UROBILINOGEN UR QL STRIP.AUTO: 0.2 E.U./DL
VIT B12 SERPL-MCNC: 330 PG/ML (ref 180–914)
WBC # BLD AUTO: 10.34 THOUSAND/UL (ref 4.31–10.16)
WBC # BLD AUTO: 9.4 THOUSAND/UL (ref 4.31–10.16)
WBC #/AREA URNS AUTO: NORMAL /HPF

## 2025-05-21 PROCEDURE — 82728 ASSAY OF FERRITIN: CPT

## 2025-05-21 PROCEDURE — P9016 RBC LEUKOCYTES REDUCED: HCPCS

## 2025-05-21 PROCEDURE — 86923 COMPATIBILITY TEST ELECTRIC: CPT

## 2025-05-21 PROCEDURE — 83540 ASSAY OF IRON: CPT

## 2025-05-21 PROCEDURE — 84443 ASSAY THYROID STIM HORMONE: CPT

## 2025-05-21 PROCEDURE — 86850 RBC ANTIBODY SCREEN: CPT

## 2025-05-21 PROCEDURE — 71046 X-RAY EXAM CHEST 2 VIEWS: CPT

## 2025-05-21 PROCEDURE — 85610 PROTHROMBIN TIME: CPT

## 2025-05-21 PROCEDURE — 86901 BLOOD TYPING SEROLOGIC RH(D): CPT

## 2025-05-21 PROCEDURE — 36415 COLL VENOUS BLD VENIPUNCTURE: CPT

## 2025-05-21 PROCEDURE — 83010 ASSAY OF HAPTOGLOBIN QUANT: CPT

## 2025-05-21 PROCEDURE — 81001 URINALYSIS AUTO W/SCOPE: CPT

## 2025-05-21 PROCEDURE — 74177 CT ABD & PELVIS W/CONTRAST: CPT

## 2025-05-21 PROCEDURE — 83615 LACTATE (LD) (LDH) ENZYME: CPT

## 2025-05-21 PROCEDURE — 83605 ASSAY OF LACTIC ACID: CPT

## 2025-05-21 PROCEDURE — 83550 IRON BINDING TEST: CPT

## 2025-05-21 PROCEDURE — 80053 COMPREHEN METABOLIC PANEL: CPT

## 2025-05-21 PROCEDURE — 85025 COMPLETE CBC W/AUTO DIFF WBC: CPT

## 2025-05-21 PROCEDURE — 82607 VITAMIN B-12: CPT

## 2025-05-21 PROCEDURE — 99284 EMERGENCY DEPT VISIT MOD MDM: CPT

## 2025-05-21 PROCEDURE — 93005 ELECTROCARDIOGRAM TRACING: CPT

## 2025-05-21 PROCEDURE — 83036 HEMOGLOBIN GLYCOSYLATED A1C: CPT

## 2025-05-21 PROCEDURE — 30233N1 TRANSFUSION OF NONAUTOLOGOUS RED BLOOD CELLS INTO PERIPHERAL VEIN, PERCUTANEOUS APPROACH: ICD-10-PCS

## 2025-05-21 PROCEDURE — 85045 AUTOMATED RETICULOCYTE COUNT: CPT

## 2025-05-21 PROCEDURE — 85018 HEMOGLOBIN: CPT

## 2025-05-21 PROCEDURE — 36430 TRANSFUSION BLD/BLD COMPNT: CPT

## 2025-05-21 PROCEDURE — 99291 CRITICAL CARE FIRST HOUR: CPT

## 2025-05-21 PROCEDURE — 85730 THROMBOPLASTIN TIME PARTIAL: CPT

## 2025-05-21 PROCEDURE — 80061 LIPID PANEL: CPT

## 2025-05-21 PROCEDURE — 86900 BLOOD TYPING SEROLOGIC ABO: CPT

## 2025-05-21 PROCEDURE — 84484 ASSAY OF TROPONIN QUANT: CPT

## 2025-05-21 PROCEDURE — 83735 ASSAY OF MAGNESIUM: CPT

## 2025-05-21 PROCEDURE — 99223 1ST HOSP IP/OBS HIGH 75: CPT

## 2025-05-21 RX ORDER — FELODIPINE 2.5 MG/1
5 TABLET, EXTENDED RELEASE ORAL EVERY MORNING
Status: DISCONTINUED | OUTPATIENT
Start: 2025-05-22 | End: 2025-05-24 | Stop reason: HOSPADM

## 2025-05-21 RX ORDER — PANTOPRAZOLE SODIUM 40 MG/10ML
40 INJECTION, POWDER, LYOPHILIZED, FOR SOLUTION INTRAVENOUS EVERY 12 HOURS SCHEDULED
Status: DISCONTINUED | OUTPATIENT
Start: 2025-05-21 | End: 2025-05-24 | Stop reason: HOSPADM

## 2025-05-21 RX ORDER — ALPRAZOLAM 0.25 MG
0.25 TABLET ORAL 3 TIMES DAILY PRN
Status: DISCONTINUED | OUTPATIENT
Start: 2025-05-21 | End: 2025-05-24 | Stop reason: HOSPADM

## 2025-05-21 RX ORDER — BUDESONIDE AND FORMOTEROL FUMARATE DIHYDRATE 160; 4.5 UG/1; UG/1
2 AEROSOL RESPIRATORY (INHALATION) 2 TIMES DAILY
Status: DISCONTINUED | OUTPATIENT
Start: 2025-05-21 | End: 2025-05-24 | Stop reason: HOSPADM

## 2025-05-21 RX ORDER — LOSARTAN POTASSIUM 50 MG/1
50 TABLET ORAL EVERY MORNING
Status: DISCONTINUED | OUTPATIENT
Start: 2025-05-22 | End: 2025-05-24 | Stop reason: HOSPADM

## 2025-05-21 RX ORDER — GABAPENTIN 100 MG/1
100 CAPSULE ORAL
Status: DISCONTINUED | OUTPATIENT
Start: 2025-05-21 | End: 2025-05-24 | Stop reason: HOSPADM

## 2025-05-21 RX ADMIN — GABAPENTIN 100 MG: 100 CAPSULE ORAL at 23:06

## 2025-05-21 RX ADMIN — ALPRAZOLAM 0.25 MG: 0.25 TABLET ORAL at 20:57

## 2025-05-21 RX ADMIN — IOHEXOL 100 ML: 350 INJECTION, SOLUTION INTRAVENOUS at 21:07

## 2025-05-21 RX ADMIN — PANTOPRAZOLE SODIUM 40 MG: 40 INJECTION, POWDER, FOR SOLUTION INTRAVENOUS at 23:42

## 2025-05-21 NOTE — ASSESSMENT & PLAN NOTE
Hyponatremia with sodium 130.  Unsure if it is in the setting of poor oral intake  Reassess BMP tomorrow after blood transfusion.

## 2025-05-21 NOTE — ASSESSMENT & PLAN NOTE
Does not appear to be in acute exacerbation.  Recommended as needed albuterol for shortness of breath.

## 2025-05-21 NOTE — ASSESSMENT & PLAN NOTE
Patient sent to the ED by her PCP due to critical lab work.  Hemoglobin 4.9.  Last year hemoglobin was 13.   patient also admits significant fatigue, shortness of breath, leg cramping and ice craving.  Denies GI bleed however patient also with macular degeneration and does not have a great vision.  Patient with no known history of GI bleed, ulcers, colonic mass.  Last colonoscopy 5/2015 and per patient it was normal.  Denies abdominal pain or weight loss however patient admits having constipation.  Received 2 units of PRBC in the ED  Suspicion for GI source of bleeding.  Possible lower GI bleed rather than upper Gi bleed.  Plan:  Reassess hemoglobin after 2 units of PRBC and continue transfusion if needed.  Pending iron panel and the rest of workup for anemia.  Consider transfusing Venofer if needed.  GI team consulted  Patient will be made NPO after midnight in anticipation of possible GI intervention.  Started PPI every 12 hours for the possible upper GI involvement.  Will obtain a CT abdomen and pelvis to rule out intra-abdominal pathology such as tumor.

## 2025-05-21 NOTE — PLAN OF CARE
Problem: HEMATOLOGIC - ADULT  Goal: Maintains hematologic stability  Description: INTERVENTIONS  - Assess for signs and symptoms of bleeding or hemorrhage  - Monitor labs  - Administer supportive blood products/factors as ordered and appropriate  Outcome: Progressing     Problem: INFECTION - ADULT  Goal: Absence or prevention of progression during hospitalization  Description: INTERVENTIONS:  - Assess and monitor for signs and symptoms of infection  - Monitor lab/diagnostic results  - Monitor all insertion sites, i.e. indwelling lines, tubes, and drains  - Monitor endotracheal if appropriate and nasal secretions for changes in amount and color  - [place] appropriate cooling/warming therapies per order  - Administer medications as ordered  - Instruct and encourage patient and family to use good hand hygiene technique  - Identify and instruct in appropriate isolation precautions for identified infection/condition  Outcome: Progressing     Problem: PAIN - ADULT  Goal: Verbalizes/displays adequate comfort level or baseline comfort level  Description: Interventions:  - Encourage patient to monitor pain and request assistance  - Assess pain using appropriate pain scale  - Administer analgesics as ordered based on type and severity of pain and evaluate response  - Implement non-pharmacological measures as appropriate and evaluate response  - Consider cultural and social influences on pain and pain management  - Notify physician/advanced practitioner if interventions unsuccessful or patient reports new pain  - Educate patient/family on pain management process including their role and importance of  reporting pain   - Provide non-pharmacologic/complimentary pain relief interventions  Outcome: Progressing     Problem: SAFETY ADULT  Goal: Patient will remain free of falls  Description: INTERVENTIONS:  - Educate patient/family on patient safety including physical limitations  - Instruct patient to call for assistance with  activity   - Consider consulting OT/PT to assist with strengthening/mobility based on AM PAC & JH-HLM score  - Consult OT/PT to assist with strengthening/mobility   - Keep Call bell within reach  - Keep bed low and locked with side rails adjusted as appropriate  - Keep care items and personal belongings within reach  - Initiate and maintain comfort rounds    Problem: Knowledge Deficit  Goal: Patient/family/caregiver demonstrates understanding of disease process, treatment plan, medications, and discharge instructions  Description: Complete learning assessment and assess knowledge base.  Interventions:  - Provide teaching at level of understanding  - Provide teaching via preferred learning methods  Outcome: Progressing     Problem: DISCHARGE PLANNING  Goal: Discharge to home or other facility with appropriate resources  Description: INTERVENTIONS:  - Identify barriers to discharge w/patient and caregiver  - Arrange for needed discharge resources and transportation as appropriate  - Identify discharge learning needs (meds, wound care, etc.)  - Arrange for interpretive services to assist at discharge as needed  - Refer to Case Management Department for coordinating discharge planning if the patient needs post-hospital services based on physician/advanced practitioner order or complex needs related to functional status, cognitive ability, or social support system  Outcome: Progressing   - Consider moving patient to room near nurses station  Outcome: Progressing

## 2025-05-21 NOTE — H&P
H&P - Hospitalist   Name: Bre Staton 75 y.o. female I MRN: [id number]  Unit/Bed#: ED 07 I Date of Admission: 5/21/2025   Date of Service: 5/21/2025 I Hospital Day: 0     Assessment & Plan  Acute anemia  Patient sent to the ED by her PCP due to critical lab work.  Hemoglobin 4.9.  Last year hemoglobin was 13.   patient also admits significant fatigue, shortness of breath, leg cramping and ice craving.  Denies GI bleed however patient also with macular degeneration and does not have a great vision.  Patient with no known history of GI bleed, ulcers, colonic mass.  Last colonoscopy 5/2015 and per patient it was normal.  Denies abdominal pain or weight loss however patient admits having constipation.  Received 2 units of PRBC in the ED  Suspicion for GI source of bleeding.  Possible lower GI bleed rather than upper Gi bleed.  Plan:  Reassess hemoglobin after 2 units of PRBC and continue transfusion if needed.  Pending iron panel and the rest of workup for anemia.  Consider transfusing Venofer if needed.  GI team consulted  Patient will be made NPO after midnight in anticipation of possible GI intervention.  Started PPI every 12 hours for the possible upper GI involvement.  Will obtain a CT abdomen and pelvis to rule out intra-abdominal pathology such as tumor.    Asthma, moderate persistent  Does not appear to be in acute exacerbation.  Recommended as needed albuterol for shortness of breath.  Exudative age-related macular degeneration of right eye with active choroidal neovascularization (HCC)  Follows with ophthalmology team and receives injection.  Essential hypertension  Patient on felodipine 5 mg daily and losartan 50 mg daily.  Continue home dose  Thrombocytosis  Thrombocytosis likely reactive from anemia.  Continue with management for acute anemia  Hyponatremia  Hyponatremia with sodium 130.  Unsure if it is in the setting of poor oral intake  Reassess BMP tomorrow after blood transfusion.      VTE  Pharmacologic Prophylaxis:   Moderate Risk (Score 3-4) - Pharmacological DVT Prophylaxis Contraindicated. Sequential Compression Devices Ordered.  Code Status: Level 1 - Full Code patient  Discussion with family: Updated  () at bedside.    Anticipated Length of Stay: Patient will be admitted on an inpatient basis with an anticipated length of stay of greater than 2 midnights secondary to significant anemia.    History of Present Illness   Chief Complaint: Shortness of breath, fatigue, abnormal blood work    Bre Staton is a 75 y.o. female with a PMH of asthma, hypertension, hyperlipidemia who presents with ongoing shortness of breath, fatigue.  Patient saw her PCP 2 days ago who recommended routine blood work.  She was noted to have critical result with hemoglobin 4.9 and was sent to the ED. Patient admits that her symptoms such as shortness of breath and fatigue started since COVID-19 infection that she had in February.  Denies being on blood thinners.  Patient admits having constipation for which she has been taking as needed laxatives.  She reports her stool consistency being regular however not sure if she has dark or bloody stools.  Patient overall hemodynamically stable.  No signs of active bleeding.  Admitted for further workup for acute anemia.    Review of Systems   Constitutional:  Positive for fatigue. Negative for chills and fever.   HENT:  Negative for ear pain and sore throat.    Eyes:  Negative for pain and visual disturbance.   Respiratory:  Positive for shortness of breath. Negative for cough.    Cardiovascular:  Negative for chest pain and palpitations.   Gastrointestinal:  Negative for abdominal pain and vomiting.   Genitourinary:  Negative for dysuria and hematuria.   Musculoskeletal:  Positive for myalgias. Negative for arthralgias and back pain.   Skin:  Positive for pallor. Negative for color change and rash.   Neurological:  Negative for seizures and syncope.   All other  systems reviewed and are negative.      Historical Information   Past Medical History[1]  Past Surgical History[2]  Social History[3]  E-Cigarette/Vaping    E-Cigarette Use Never User      E-Cigarette/Vaping Substances    Nicotine No     THC No     CBD No     Flavoring No     Other No     Unknown No        Social History:  Marital Status: /Civil Union   Occupation:   Patient Pre-hospital Living Situation: Home  Patient Pre-hospital Level of Mobility: walks  Patient Pre-hospital Diet Restrictions: none    Meds/Allergies   I have reviewed home medications with patient personally.  Prior to Admission medications    Medication Sig Start Date End Date Taking? Authorizing Provider   ALPRAZolam (XANAX) 0.25 mg tablet Take 0.25 mg by mouth 3 (three) times a day as needed for anxiety 1/15/25  Yes Historical Provider, MD   budesonide-formoterol (Symbicort) 160-4.5 mcg/act inhaler Inhale 2 puffs 2 (two) times a day   Yes Historical Provider, MD   felodipine (PLENDIL) 5 mg 24 hr tablet Take 5 mg by mouth every morning 1/30/25  Yes Historical Provider, MD   gabapentin (NEURONTIN) 100 mg capsule Take 1 capsule (100 mg total) by mouth daily at bedtime 5/19/25  Yes Braden Bernal MD   lansoprazole (PREVACID) 30 mg capsule TAKE 1 CAPSULE BY MOUTH EVERY DAY IN THE MORNING BEFORE A MEAL 5/7/25  Yes Historical Provider, MD   losartan (COZAAR) 50 mg tablet Take 50 mg by mouth every morning 1/30/25  Yes Historical Provider, MD   Omega-3 Fatty Acids (Fish Oil) 300 MG CAPS Take 1 tablet by mouth every morning   Yes Historical Provider, MD   albuterol (2.5 mg/3 mL) 0.083 % nebulizer solution Inhale 2.5 mg in the morning and 2.5 mg at noon and 2.5 mg in the evening.    Historical Provider, MD   albuterol (Albuterol Sulfate) (5 mg/mL) 0.5 % nebulizer solution Inhale    Historical Provider, MD   albuterol (PROVENTIL HFA,VENTOLIN HFA) 90 mcg/act inhaler Inhale 2 puffs    Historical Provider, MD   Albuterol Sulfate 108 (90 Base)  MCG/ACT AEPB Inhale 2 puffs    Historical Provider, MD     Allergies   Allergen Reactions    Azithromycin Rash     States does work for pt.       Objective :  Temp:  [98.2 °F (36.8 °C)-98.6 °F (37 °C)] 98.3 °F (36.8 °C)  HR:  [78-87] 78  BP: (130-158)/(70-78) 153/78  Resp:  [16-22] 17  SpO2:  [94 %-100 %] 94 %  O2 Device: None (Room air)    Physical Exam  Vitals and nursing note reviewed.   Constitutional:       General: She is not in acute distress.     Appearance: She is well-developed.   HENT:      Head: Normocephalic and atraumatic.     Eyes:      Conjunctiva/sclera: Conjunctivae normal.       Cardiovascular:      Rate and Rhythm: Normal rate and regular rhythm.      Heart sounds: No murmur heard.  Pulmonary:      Effort: Pulmonary effort is normal. No respiratory distress.      Breath sounds: Normal breath sounds. No wheezing or rales.   Abdominal:      Palpations: Abdomen is soft.      Tenderness: There is no abdominal tenderness.     Musculoskeletal:         General: No swelling.      Cervical back: Neck supple.      Right lower leg: No edema.      Left lower leg: No edema.     Skin:     General: Skin is warm and dry.      Capillary Refill: Capillary refill takes less than 2 seconds.      Coloration: Skin is pale.     Neurological:      Mental Status: She is alert.     Psychiatric:         Mood and Affect: Mood normal.          Lines/Drains:            Lab Results: I have reviewed the following results:  Results from last 7 days   Lab Units 05/21/25  1423   WBC Thousand/uL 10.34*   HEMOGLOBIN g/dL 4.9*   HEMATOCRIT % 17.8*   PLATELETS Thousands/uL 501*   SEGS PCT % 70   LYMPHO PCT % 15   MONO PCT % 11   EOS PCT % 2     Results from last 7 days   Lab Units 05/21/25  1423   SODIUM mmol/L 130*   POTASSIUM mmol/L 3.7   CHLORIDE mmol/L 99   CO2 mmol/L 22   BUN mg/dL 7   CREATININE mg/dL 0.45*   ANION GAP mmol/L 9   CALCIUM mg/dL 9.1   ALBUMIN g/dL 4.2   TOTAL BILIRUBIN mg/dL 0.62   ALK PHOS U/L 91   ALT U/L 17  "  AST U/L 19   GLUCOSE RANDOM mg/dL 103     Results from last 7 days   Lab Units 05/21/25  1423   INR  1.10         No results found for: \"HGBA1C\"  Results from last 7 days   Lab Units 05/21/25  1433   LACTIC ACID mmol/L 1.5       Imaging Results Review: I reviewed radiology reports from this admission including: chest xray.  Other Study Results Review: No additional pertinent studies reviewed.    Administrative Statements       ** Please Note: This note has been constructed using a voice recognition system. **         [1]   Past Medical History:  Diagnosis Date    Anxiety     Arthritis     Asthma     GERD (gastroesophageal reflux disease)     Substance abuse (HCC)     Visual impairment     macular degeneration   [2] No past surgical history on file.  [3]   Social History  Tobacco Use    Smoking status: Never    Smokeless tobacco: Never   Vaping Use    Vaping status: Never Used   Substance and Sexual Activity    Alcohol use: Yes     Alcohol/week: 14.0 standard drinks of alcohol     Types: 14 Glasses of wine per week    Drug use: Not Currently    Sexual activity: Not Currently     "

## 2025-05-21 NOTE — ED PROVIDER NOTES
Time reflects when diagnosis was documented in both MDM as applicable and the Disposition within this note       Time User Action Codes Description Comment    5/21/2025  3:58 PM Charlie Calix Add [D64.9] Anemia           ED Disposition       ED Disposition   Admit    Condition   Stable    Date/Time   Wed May 21, 2025  3:58 PM    Comment   Case was discussed with armando and the patient's admission status was agreed to be Admission Status: inpatient status to the service of Dr. Oliveira.               Assessment & Plan       Medical Decision Making  Unclear etiology.  Does have a microcytic anemia it appears.  Differential is broad at this time.  Will obtain labs to look for exact cause.  Will perform a iFOBT.  Will require blood transfusion and likely admission.    Patient states that she is scheduled to do Cologuard last colonoscopy was approximately 10 years ago.    Unclear etiology still.  At this time will admit to [facility] for further management and transfusion.    Amount and/or Complexity of Data Reviewed  Labs: ordered. Decision-making details documented in ED Course.  ECG/medicine tests:  Decision-making details documented in ED Course.    Risk  Decision regarding hospitalization.    Critical Care Time Statement: Upon my evaluation, this patient had a high probability of imminent or life-threatening deterioration due to severe symptomatic anemia, which required my direct attention, intervention, and personal management.  I spent a total of 33 minutes directly providing critical care services, including interpretation of complex medical databases, evaluating for the presence of life-threatening injuries or illnesses, complex medical decision making (to support/prevent further life-threatening deterioration)., and interpretation of hemodynamic data. This time is exclusive of procedures, teaching, treating other patients, family meetings, and any prior time recorded by providers other than myself.        ED Course as of  05/21/25 1602   Wed May 21, 2025   1430 ECG 12 lead  Sinus rhythm, normal axis, normal intervals, no ST elevations or depressions.  Subtle ST flattening in lead III.  Otherwise normal EKG.  As interpreted by me   1448 Reticulocytes Percent(!): 3.23   1455 LD (LDH)(!): 106   1456 hs TnI 0hr: 6   1501 Hemoglobin(!!): 4.9  Plan to transfuse 2 units       Medications - No data to display    ED Risk Strat Scores                    No data recorded                            History of Present Illness       Chief Complaint   Patient presents with    Evaluation of Abnormal Diagnostic Test     HGB  4.7  per recent labs. C/o  fatigue, states this started after she had covid.        Past Medical History:   Diagnosis Date    Anxiety     Arthritis     Asthma     GERD (gastroesophageal reflux disease)     Substance abuse (HCC)     Visual impairment     macular degeneration      No past surgical history on file.   No family history on file.   Social History[1]   E-Cigarette/Vaping    E-Cigarette Use Never User       E-Cigarette/Vaping Substances    Nicotine No     THC No     CBD No     Flavoring No     Other No     Unknown No       I have reviewed and agree with the history as documented.     75-year-old female past medical history of asthma, hyperlipidemia, macular degeneration, hypertension presenting the emergency department for low hemoglobin.  Patient had routine labs done and they know that she has a hemoglobin of 4.7.  She states that she got COVID at the end of February and ever since then has been increasingly fatigued.  Patient states that she does not believe that she is passing blood in her stool but has macular degeneration cannot see super well.  Denies any hematemesis.  Denies any vaginal discharge.  Does not believe she is urinating blood.  She states that she may be lost a bit of weight.  Per the  she does have a little loss of color in her skin.  She states that she gets tired whenever she goes and walks  anywhere.  She says that she thought it was due to COVID.  Patient does have a history of constipation.  She states that she does not have any headache, dizziness, chest pain, nausea, vomiting, diarrhea, abdominal pain, dysuria.        Evaluation of Abnormal Diagnostic Test      Review of Systems        Objective       ED Triage Vitals   Temperature Pulse Blood Pressure Respirations SpO2 Patient Position - Orthostatic VS   05/21/25 1354 05/21/25 1354 05/21/25 1354 05/21/25 1354 05/21/25 1354 05/21/25 1354   98.6 °F (37 °C) 87 147/72 18 100 % Sitting      Temp Source Heart Rate Source BP Location FiO2 (%) Pain Score    05/21/25 1354 05/21/25 1354 05/21/25 1354 -- 05/21/25 1545    Temporal Monitor Left arm  No Pain      Vitals      Date and Time Temp Pulse SpO2 Resp BP Pain Score FACES Pain Rating User   05/21/25 1549 98.4 °F (36.9 °C) 80 95 % 21 139/74 -- -- SM   05/21/25 1545 98.4 °F (36.9 °C) 79 94 % 18 139/74 No Pain -- SM   05/21/25 1539 98.3 °F (36.8 °C) 80 94 % 16 130/73 -- -- SM   05/21/25 1526 98.2 °F (36.8 °C) 80 -- 16 141/70 -- -- SM   05/21/25 1500 98.2 °F (36.8 °C) 82 100 % 16 147/75 -- -- SM   05/21/25 1354 98.6 °F (37 °C) 87 100 % 18 147/72 -- -- AS            Physical Exam  Vitals and nursing note reviewed.   Constitutional:       Appearance: She is well-developed. She is ill-appearing.   HENT:      Head: Normocephalic and atraumatic.      Nose: Nose normal.      Mouth/Throat:      Mouth: Mucous membranes are moist.      Pharynx: No oropharyngeal exudate or posterior oropharyngeal erythema.     Eyes:      Extraocular Movements: Extraocular movements intact.      Conjunctiva/sclera: Conjunctivae normal.      Pupils: Pupils are equal, round, and reactive to light.       Cardiovascular:      Rate and Rhythm: Normal rate and regular rhythm.      Pulses: Normal pulses.      Heart sounds: Normal heart sounds.   Pulmonary:      Effort: Pulmonary effort is normal.      Breath sounds: Normal breath sounds.    Abdominal:      General: Bowel sounds are normal. There is no distension.      Palpations: Abdomen is soft.      Tenderness: There is no abdominal tenderness. There is no guarding or rebound.   Genitourinary:     Comments: External hemorrhoids.  No palpable internal hemorrhoids.  No gross blood or melena.    Musculoskeletal:         General: Normal range of motion.      Cervical back: Normal range of motion and neck supple.      Right lower leg: No edema.      Left lower leg: No edema.     Skin:     General: Skin is warm and dry.      Capillary Refill: Capillary refill takes less than 2 seconds.      Coloration: Skin is pale.      Comments: Sallow appearing     Neurological:      General: No focal deficit present.      Mental Status: She is alert and oriented to person, place, and time.      Cranial Nerves: No cranial nerve deficit.         Results Reviewed       Procedure Component Value Units Date/Time    UA w Reflex to Microscopic w Reflex to Culture [[id number]] Collected: 05/21/25 1544    Lab Status: In process Specimen: Urine, Clean Catch Updated: 05/21/25 1553    iFOBT (FIT) [[id number]] Updated: 05/21/25 1459    Lab Status: No result Specimen: Stool from Per Rectum     Protime-INR [[id number]]  (Normal) Collected: 05/21/25 1423    Lab Status: Final result Specimen: Blood from Arm, Left Updated: 05/21/25 1459     Protime 14.9 seconds      INR 1.10    Narrative:      INR Therapeutic Range    Indication                                             INR Range      Atrial Fibrillation                                               2.0-3.0  Hypercoagulable State                                    2.0.2.3  Left Ventricular Asist Device                            2.0-3.0  Mechanical Heart Valve                                  -    Aortic(with afib, MI, embolism, HF, LA enlargement,    and/or coagulopathy)                                     2.0-3.0 (2.5-3.5)     Mitral                                                              2.5-3.5  Prosthetic/Bioprosthetic Heart Valve               2.0-3.0  Venous thromboembolism (VTE: VT, PE        2.0-3.0    APTT [[id number]]  (Normal) Collected: 05/21/25 1423    Lab Status: Final result Specimen: Blood from Arm, Left Updated: 05/21/25 1459     PTT 26 seconds     Lactic acid, plasma (w/reflex if result > 2.0) [[id number]]  (Normal) Collected: 05/21/25 1433    Lab Status: Final result Specimen: Blood from Arm, Left Updated: 05/21/25 1457     LACTIC ACID 1.5 mmol/L     Narrative:      Result may be elevated if tourniquet was used during collection.    HS Troponin 0hr (reflex protocol) [[id number]]  (Normal) Collected: 05/21/25 1423    Lab Status: Final result Specimen: Blood from Arm, Left Updated: 05/21/25 1456     hs TnI 0hr 6 ng/L     HS Troponin I 2hr [[id number]]     Lab Status: No result Specimen: Blood     LD,Blood [[id number]]  (Abnormal) Collected: 05/21/25 1423    Lab Status: Final result Specimen: Blood from Arm, Left Updated: 05/21/25 1449      U/L     Comprehensive metabolic panel [[id number]]  (Abnormal) Collected: 05/21/25 1423    Lab Status: Final result Specimen: Blood from Arm, Left Updated: 05/21/25 1449     Sodium 130 mmol/L      Potassium 3.7 mmol/L      Chloride 99 mmol/L      CO2 22 mmol/L      ANION GAP 9 mmol/L      BUN 7 mg/dL      Creatinine 0.45 mg/dL      Glucose 103 mg/dL      Calcium 9.1 mg/dL      AST 19 U/L      ALT 17 U/L      Alkaline Phosphatase 91 U/L      Total Protein 7.0 g/dL      Albumin 4.2 g/dL      Total Bilirubin 0.62 mg/dL      eGFR 99 ml/min/1.73sq m     Narrative:      National Kid[employer] Disease Foundation guidelines for Chronic Kidney Disease (CKD):     Stage 1 with normal or high GFR (GFR > 90 mL/min/1.73 square meters)    Stage 2 Mild CKD (GFR = 60-89 mL/min/1.73 square meters)    Stage 3A Moderate CKD (GFR = 45-59 mL/min/1.73 square meters)    Stage 3B Moderate CKD (GFR = 30-44 mL/min/1.73 square meters)    Stage 4 Severe CKD (GFR = 15-29  mL/min/1.73 square meters)    Stage 5 End Stage CKD (GFR <15 mL/min/1.73 square meters)  Note: GFR calculation is accurate only with a steady state creatinine    Magnesium [[id number]]  (Normal) Collected: 05/21/25 1423    Lab Status: Final result Specimen: Blood from Arm Left Updated: 05/21/25 1449     Magnesium 2.0 mg/dL     CBC and differential [[id number]]  (Abnormal) Collected: 05/21/25 1423    Lab Status: Final result Specimen: Blood from Arm Left Updated: 05/21/25 1441     WBC 10.34 Thousand/uL      RBC 2.42 Million/uL      Hemoglobin 4.9 g/dL      Hematocrit 17.8 %      MCV 74 fL      MCH 19.8 pg      MCHC 27.0 g/dL      RDW 19.1 %      MPV 8.7 fL      Platelets 501 Thousands/uL      nRBC 0 /100 WBCs      Segmented % 70 %      Immature Grans % 1 %      Lymphocytes % 15 %      Monocytes % 11 %      Eosinophils Relative 2 %      Basophils Relative 1 %      Absolute Neutrophils 7.34 Thousands/µL      Absolute Immature Grans 0.06 Thousand/uL      Absolute Lymphocytes 1.52 Thousands/µL      Absolute Monocytes 1.15 Thousand/µL      Eosinophils Absolute 0.20 Thousand/µL      Basophils Absolute 0.07 Thousands/µL     Reticulocytes [[id number]]  (Abnormal) Collected: 05/21/25 1423    Lab Status: Final result Specimen: Blood from Arm Left Updated: 05/21/25 1441     Retic Ct Abs 78,200     Retic Ct Pct 3.23 %     Haptoglobin [[id number]] Collected: 05/21/25 1433    Lab Status: In process Specimen: Blood from Arm Left Updated: 05/21/25 1440            No orders to display       Procedures    ED Medication and Procedure Management   Prior to Admission Medications   Prescriptions Last Dose Informant Patient Reported? Taking?   ALPRAZolam (XANAX) 0.25 mg tablet   Yes No   Sig: Take 0.25 mg by mouth 3 (three) times a day as needed for anxiety   Albuterol Sulfate 108 (90 Base) MCG/ACT AEPB   Yes No   Sig: Inhale 2 puffs   Omega-3 Fatty Acids (Fish Oil) 300 MG CAPS   Yes No   Sig: Take 1 tablet by mouth every morning    albuterol (2.5 mg/3 mL) 0.083 % nebulizer solution   Yes No   Sig: Inhale 2.5 mg in the morning and 2.5 mg at noon and 2.5 mg in the evening.   albuterol (Albuterol Sulfate) (5 mg/mL) 0.5 % nebulizer solution   Yes No   Sig: Inhale   albuterol (PROVENTIL HFA,VENTOLIN HFA) 90 mcg/act inhaler   Yes No   Sig: Inhale 2 puffs   budesonide-formoterol (Symbicort) 160-4.5 mcg/act inhaler   Yes No   Sig: Inhale 2 puffs 2 (two) times a day   felodipine (PLENDIL) 5 mg 24 hr tablet   Yes No   Sig: Take 5 mg by mouth every morning   gabapentin (NEURONTIN) 100 mg capsule   No No   Sig: Take 1 capsule (100 mg total) by mouth daily at bedtime   lansoprazole (PREVACID) 30 mg capsule   Yes No   Sig: TAKE 1 CAPSULE BY MOUTH EVERY DAY IN THE MORNING BEFORE A MEAL   losartan (COZAAR) 50 mg tablet   Yes No   Sig: Take 50 mg by mouth every morning      Facility-Administered Medications: None     Patient's Medications   Discharge Prescriptions    No medications on file     No discharge procedures on file.  ED SEPSIS DOCUMENTATION   Time reflects when diagnosis was documented in both MDM as applicable and the Disposition within this note       Time User Action Codes Description Comment    5/21/2025  3:58 PM Charlie Calix Add [D64.9] Anemia                      [1]   Social History  Tobacco Use    Smoking status: Never    Smokeless tobacco: Never   Vaping Use    Vaping status: Never Used   Substance Use Topics    Alcohol use: Yes     Alcohol/week: 14.0 standard drinks of alcohol     Types: 14 Glasses of wine per week    Drug use: Not Currently        Charlie Calix MD  05/21/25 1983

## 2025-05-22 PROBLEM — D50.9 IRON DEFICIENCY ANEMIA: Status: ACTIVE | Noted: 2025-05-22

## 2025-05-22 LAB
ABO GROUP BLD BPU: NORMAL
ABO GROUP BLD BPU: NORMAL
ANION GAP SERPL CALCULATED.3IONS-SCNC: 8 MMOL/L (ref 4–13)
ATRIAL RATE: 84 BPM
ATRIAL RATE: 85 BPM
ATRIAL RATE: 85 BPM
ATRIAL RATE: 86 BPM
BPU ID: NORMAL
BPU ID: NORMAL
BUN SERPL-MCNC: 6 MG/DL (ref 5–25)
CA-I BLD-SCNC: 1.11 MMOL/L (ref 1.12–1.32)
CALCIUM SERPL-MCNC: 8.7 MG/DL (ref 8.4–10.2)
CHLORIDE SERPL-SCNC: 104 MMOL/L (ref 96–108)
CO2 SERPL-SCNC: 21 MMOL/L (ref 21–32)
CREAT SERPL-MCNC: 0.36 MG/DL (ref 0.6–1.3)
CROSSMATCH: NORMAL
CROSSMATCH: NORMAL
ERYTHROCYTE [DISTWIDTH] IN BLOOD BY AUTOMATED COUNT: 19 % (ref 11.6–15.1)
GFR SERPL CREATININE-BSD FRML MDRD: 107 ML/MIN/1.73SQ M
GLUCOSE SERPL-MCNC: 97 MG/DL (ref 65–140)
HAPTOGLOB SERPL-MCNC: 199 MG/DL (ref 42–346)
HCT VFR BLD AUTO: 22.4 % (ref 34.8–46.1)
HGB BLD-MCNC: 6.8 G/DL (ref 11.5–15.4)
HGB BLD-MCNC: 8.5 G/DL (ref 11.5–15.4)
MCH RBC QN AUTO: 23 PG (ref 26.8–34.3)
MCHC RBC AUTO-ENTMCNC: 30.4 G/DL (ref 31.4–37.4)
MCV RBC AUTO: 76 FL (ref 82–98)
P AXIS: 11 DEGREES
P AXIS: 13 DEGREES
P AXIS: 30 DEGREES
P AXIS: 7 DEGREES
PLATELET # BLD AUTO: 382 THOUSANDS/UL (ref 149–390)
PMV BLD AUTO: 8.3 FL (ref 8.9–12.7)
POTASSIUM SERPL-SCNC: 3.8 MMOL/L (ref 3.5–5.3)
PR INTERVAL: 130 MS
PR INTERVAL: 132 MS
QRS AXIS: 56 DEGREES
QRS AXIS: 60 DEGREES
QRS AXIS: 63 DEGREES
QRS AXIS: 67 DEGREES
QRSD INTERVAL: 74 MS
QRSD INTERVAL: 76 MS
QT INTERVAL: 376 MS
QT INTERVAL: 378 MS
QT INTERVAL: 384 MS
QT INTERVAL: 388 MS
QTC INTERVAL: 447 MS
QTC INTERVAL: 452 MS
QTC INTERVAL: 456 MS
QTC INTERVAL: 458 MS
RBC # BLD AUTO: 2.96 MILLION/UL (ref 3.81–5.12)
SODIUM SERPL-SCNC: 133 MMOL/L (ref 135–147)
SODIUM UR-SCNC: 134 MMOL/L
T WAVE AXIS: 20 DEGREES
T WAVE AXIS: 20 DEGREES
T WAVE AXIS: 22 DEGREES
T WAVE AXIS: 35 DEGREES
UNIT DISPENSE STATUS: NORMAL
UNIT DISPENSE STATUS: NORMAL
UNIT PRODUCT CODE: NORMAL
UNIT PRODUCT CODE: NORMAL
UNIT PRODUCT VOLUME: 350 ML
UNIT PRODUCT VOLUME: 350 ML
UNIT RH: NORMAL
UNIT RH: NORMAL
VENTRICULAR RATE: 84 BPM
VENTRICULAR RATE: 85 BPM
VENTRICULAR RATE: 85 BPM
VENTRICULAR RATE: 86 BPM
WBC # BLD AUTO: 9.93 THOUSAND/UL (ref 4.31–10.16)

## 2025-05-22 PROCEDURE — 80048 BASIC METABOLIC PNL TOTAL CA: CPT

## 2025-05-22 PROCEDURE — 85027 COMPLETE CBC AUTOMATED: CPT

## 2025-05-22 PROCEDURE — P9016 RBC LEUKOCYTES REDUCED: HCPCS

## 2025-05-22 PROCEDURE — 99232 SBSQ HOSP IP/OBS MODERATE 35: CPT

## 2025-05-22 PROCEDURE — 30233N1 TRANSFUSION OF NONAUTOLOGOUS RED BLOOD CELLS INTO PERIPHERAL VEIN, PERCUTANEOUS APPROACH: ICD-10-PCS | Performed by: FAMILY MEDICINE

## 2025-05-22 PROCEDURE — 82330 ASSAY OF CALCIUM: CPT

## 2025-05-22 PROCEDURE — 93010 ELECTROCARDIOGRAM REPORT: CPT | Performed by: INTERNAL MEDICINE

## 2025-05-22 PROCEDURE — 85018 HEMOGLOBIN: CPT

## 2025-05-22 PROCEDURE — 83930 ASSAY OF BLOOD OSMOLALITY: CPT

## 2025-05-22 PROCEDURE — 99223 1ST HOSP IP/OBS HIGH 75: CPT | Performed by: INTERNAL MEDICINE

## 2025-05-22 PROCEDURE — 84300 ASSAY OF URINE SODIUM: CPT

## 2025-05-22 RX ORDER — ACETAMINOPHEN 325 MG/1
650 TABLET ORAL EVERY 6 HOURS PRN
Status: DISCONTINUED | OUTPATIENT
Start: 2025-05-22 | End: 2025-05-24 | Stop reason: HOSPADM

## 2025-05-22 RX ORDER — BISACODYL 5 MG/1
10 TABLET, DELAYED RELEASE ORAL ONCE
Status: COMPLETED | OUTPATIENT
Start: 2025-05-22 | End: 2025-05-22

## 2025-05-22 RX ORDER — ANTIOX #8/OM3/DHA/EPA/LUT/ZEAX 250-2.5 MG
1 CAPSULE ORAL 2 TIMES DAILY
Status: DISCONTINUED | OUTPATIENT
Start: 2025-05-22 | End: 2025-05-24 | Stop reason: HOSPADM

## 2025-05-22 RX ADMIN — Medication 1 CAPSULE: at 17:17

## 2025-05-22 RX ADMIN — DEXTRAN 70, GLYCERIN, HYPROMELLOSE 1 DROP: 1; 2; 3 SOLUTION/ DROPS OPHTHALMIC at 21:43

## 2025-05-22 RX ADMIN — PANTOPRAZOLE SODIUM 40 MG: 40 INJECTION, POWDER, FOR SOLUTION INTRAVENOUS at 20:38

## 2025-05-22 RX ADMIN — ALPRAZOLAM 0.25 MG: 0.25 TABLET ORAL at 20:19

## 2025-05-22 RX ADMIN — PANTOPRAZOLE SODIUM 40 MG: 40 INJECTION, POWDER, FOR SOLUTION INTRAVENOUS at 08:40

## 2025-05-22 RX ADMIN — DEXTRAN 70, GLYCERIN, HYPROMELLOSE 1 DROP: 1; 2; 3 SOLUTION/ DROPS OPHTHALMIC at 12:19

## 2025-05-22 RX ADMIN — ALPRAZOLAM 0.25 MG: 0.25 TABLET ORAL at 14:58

## 2025-05-22 RX ADMIN — DEXTRAN 70, GLYCERIN, HYPROMELLOSE 1 DROP: 1; 2; 3 SOLUTION/ DROPS OPHTHALMIC at 18:47

## 2025-05-22 RX ADMIN — DEXTRAN 70, GLYCERIN, HYPROMELLOSE 1 DROP: 1; 2; 3 SOLUTION/ DROPS OPHTHALMIC at 10:15

## 2025-05-22 RX ADMIN — DEXTRAN 70, GLYCERIN, HYPROMELLOSE 1 DROP: 1; 2; 3 SOLUTION/ DROPS OPHTHALMIC at 20:20

## 2025-05-22 RX ADMIN — FELODIPINE 5 MG: 2.5 TABLET, EXTENDED RELEASE ORAL at 08:41

## 2025-05-22 RX ADMIN — BUDESONIDE AND FORMOTEROL FUMARATE DIHYDRATE 2 PUFF: 160; 4.5 AEROSOL RESPIRATORY (INHALATION) at 08:45

## 2025-05-22 RX ADMIN — LOSARTAN POTASSIUM 50 MG: 50 TABLET, FILM COATED ORAL at 08:41

## 2025-05-22 RX ADMIN — IRON SUCROSE 300 MG: 20 INJECTION, SOLUTION INTRAVENOUS at 14:20

## 2025-05-22 RX ADMIN — GABAPENTIN 100 MG: 100 CAPSULE ORAL at 21:43

## 2025-05-22 RX ADMIN — ACETAMINOPHEN 650 MG: 325 TABLET ORAL at 21:46

## 2025-05-22 RX ADMIN — ALPRAZOLAM 0.25 MG: 0.25 TABLET ORAL at 08:41

## 2025-05-22 RX ADMIN — DEXTRAN 70, GLYCERIN, HYPROMELLOSE 1 DROP: 1; 2; 3 SOLUTION/ DROPS OPHTHALMIC at 16:15

## 2025-05-22 RX ADMIN — BISACODYL 10 MG: 5 TABLET, COATED ORAL at 12:17

## 2025-05-22 RX ADMIN — DEXTRAN 70, GLYCERIN, HYPROMELLOSE 1 DROP: 1; 2; 3 SOLUTION/ DROPS OPHTHALMIC at 14:20

## 2025-05-22 RX ADMIN — POLYETHYLENE GLYCOL 3350, SODIUM SULFATE ANHYDROUS, SODIUM BICARBONATE, SODIUM CHLORIDE, POTASSIUM CHLORIDE 4000 ML: 236; 22.74; 6.74; 5.86; 2.97 POWDER, FOR SOLUTION ORAL at 14:58

## 2025-05-22 NOTE — ASSESSMENT & PLAN NOTE
Lab Results   Component Value Date    IRON <10 (L) 05/21/2025    FERRITIN 7 (L) 05/21/2025    TIBC 567 (H) 05/21/2025    CONCFE  05/21/2025      Comment:      Unable to Calculate.   Ferritin 7  Repleting with IV venofer.

## 2025-05-22 NOTE — CONSULTS
Consultation - Gastroenterology   Name: Bre Staton 75 y.o. female I MRN: [id number]  Unit/Bed#: -01 I Date of Admission: 5/21/2025   Date of Service: 5/22/2025 I Hospital Day: 1   Inpatient consult to gastroenterology  Consult performed by: Estrellita Lizarraga PA-C  Consult ordered by: Mayra Oliveira MD        Physician Requesting Evaluation: River Rizzo MD   Reason for Evaluation / Principal Problem: Severe anemia    Assessment & Plan  Acute anemia    Iron deficiency anemia    - Patient was referred to the ED due to critical blood work with a HGB of 4.9.   - Patient reports to fatigue, shortness of breath, and weakness over the past several months.  - Iron level low.  - Patient was transfused 2 units of PRBCs and will be receiving an additional unit this am.  - No obvious melena or rectal bleeding but patient reports limited evaluation given her macular degeneration.  - Last EGD and colonoscopy were 10 years ago.  - Monitor H&H.  - Supportive care measures, Venofer per SLIM.  - PPI per SLIM.  - Will check celiac serology.  - Will plan for EGD and colonoscopy tomorrow to investigate.  Clear liquid diet today and Golytely prep. NPO after midnight.      Thank you for this consultation. The patient will be seen and evaluated by Dr. Van.    History of Present Illness   HPI:  Bre Staton is a 75 y.o. female with a PMH of HTN, asthma, hyperlipidemia who presented to the ED due to critical lab work which revealed a severe anemia with a hemoglobin of 4.9 on outpatient labs.  Patient reports that she had COVID back in February.  She reports not feeling well since that time but attributed her symptoms to COVID.  She reports of struggling with increased fatigue, shortness of breath upon exertion, and weakness.  She denies any obvious bleeding: No melena or rectal bleeding but does report that her evaluation is limited given her macular degeneration.  No hematuria or vaginal bleeding.  No abdominal pain.  No  nausea or vomiting.  She has noted some more issues with constipation since she had COVID.  She reports her last EGD and colonoscopy were 10 years ago.  She reports a history of an ulcer.    Review of Systems   Constitutional:  Positive for fatigue.   HENT: Negative.     Eyes: Negative.    Respiratory:  Positive for shortness of breath.    Cardiovascular: Negative.    Gastrointestinal: Negative.    Endocrine: Negative.    Genitourinary: Negative.    Musculoskeletal: Negative.    Neurological:  Positive for weakness.   Hematological: Negative.    Psychiatric/Behavioral: Negative.       Historical Information   Past Medical History[1]  Past Surgical History[2]  Social History[3]  E-Cigarette/Vaping    E-Cigarette Use Never User      E-Cigarette/Vaping Substances    Nicotine No     THC No     CBD No     Flavoring No     Other No     Unknown No      Family History[4]  Social History[5]    Current Facility-Administered Medications:     ALPRAZolam (XANAX) tablet 0.25 mg, TID PRN    Artificial Tears Op Soln 1 drop, Q2H    budesonide-formoterol (SYMBICORT) 160-4.5 mcg/act inhaler 2 puff, BID    felodipine (PLENDIL) 24 hr tablet 5 mg, QAM    gabapentin (NEURONTIN) capsule 100 mg, HS    iron sucrose (VENOFER) 300 mg in sodium chloride 0.9 % 250 mL IVPB, Daily    losartan (COZAAR) tablet 50 mg, QAM    pantoprazole (PROTONIX) injection 40 mg, Q12H TAYLOR  Prior to Admission Medications   Prescriptions Last Dose Informant Patient Reported? Taking?   ALPRAZolam (XANAX) 0.25 mg tablet 5/20/2025 Evening  Yes Yes   Sig: Take 0.25 mg by mouth 3 (three) times a day as needed for anxiety   Albuterol Sulfate 108 (90 Base) MCG/ACT AEPB Past Week  Yes Yes   Sig: Inhale 2 puffs   Omega-3 Fatty Acids (Fish Oil) 300 MG CAPS 5/20/2025 Morning  Yes Yes   Sig: Take 1 tablet by mouth every morning   albuterol (2.5 mg/3 mL) 0.083 % nebulizer solution 5/21/2025  Yes Yes   Sig: Inhale 2.5 mg in the morning and 2.5 mg at noon and 2.5 mg in the evening.    albuterol (Albuterol Sulfate) (5 mg/mL) 0.5 % nebulizer solution   Yes No   Sig: Inhale   albuterol (PROVENTIL HFA,VENTOLIN HFA) 90 mcg/act inhaler Past Week  Yes Yes   Sig: Inhale 2 puffs   budesonide-formoterol (Symbicort) 160-4.5 mcg/act inhaler 5/21/2025 Morning  Yes Yes   Sig: Inhale 2 puffs 2 (two) times a day   felodipine (PLENDIL) 5 mg 24 hr tablet 5/21/2025 Morning  Yes Yes   Sig: Take 5 mg by mouth every morning   gabapentin (NEURONTIN) 100 mg capsule 5/20/2025 Bedtime  No Yes   Sig: Take 1 capsule (100 mg total) by mouth daily at bedtime   lansoprazole (PREVACID) 30 mg capsule 5/21/2025 Morning  Yes Yes   Sig: TAKE 1 CAPSULE BY MOUTH EVERY DAY IN THE MORNING BEFORE A MEAL   losartan (COZAAR) 50 mg tablet 5/21/2025 Morning  Yes Yes   Sig: Take 50 mg by mouth every morning      Facility-Administered Medications: None     Azithromycin    Objective :  Temp:  [97.8 °F (36.6 °C)-99.1 °F (37.3 °C)] 98.5 °F (36.9 °C)  HR:  [77-87] 79  BP: (113-162)/(60-95) 121/68  Resp:  [16-22] 16  SpO2:  [92 %-100 %] 94 %  O2 Device: None (Room air)    Physical Exam  Constitutional:       General: She is not in acute distress.     Appearance: Normal appearance.   HENT:      Head: Normocephalic and atraumatic.     Cardiovascular:      Rate and Rhythm: Normal rate and regular rhythm.   Pulmonary:      Effort: Pulmonary effort is normal. No respiratory distress.      Breath sounds: Normal breath sounds.     Skin:     General: Skin is warm and dry.      Coloration: Skin is pale.     Neurological:      Mental Status: She is alert and oriented to person, place, and time.     Psychiatric:         Mood and Affect: Mood normal.         Behavior: Behavior normal.           Lab Results: I have reviewed the following results:CBC/BMP:   .     05/21/25  1423 05/21/25  2307 05/22/25  0454   WBC 10.34*  --  9.93   HGB 4.9*   < > 6.8*   HCT 17.8*  --  22.4*   *  --  382   SODIUM 130*  --  133*   K 3.7  --  3.8   CL 99  --  104   CO2 22   --  21   BUN 7  --  6   CREATININE 0.45*  --  0.36*   GLUC 103  --  97   CAIONIZED  --   --  1.11*   MG 2.0  --   --     < > = values in this interval not displayed.    , Creatinine Clearance: Estimated Creatinine Clearance: 130.7 mL/min (A) (by C-G formula based on SCr of 0.36 mg/dL (L))., LFTs:   .     05/21/25  1423   AST 19   ALT 17   ALB 4.2   TBILI 0.62   ALKPHOS 91    , PTT/INR:  .     05/21/25  1423   PTT 26   INR 1.10        Imaging Results Review: I reviewed radiology reports from this admission including: CT abdomen/pelvis.             [1]   Past Medical History:  Diagnosis Date    Anxiety     Arthritis     Asthma     GERD (gastroesophageal reflux disease)     Substance abuse (HCC)     Visual impairment     macular degeneration   [2] No past surgical history on file.  [3]   Social History  Tobacco Use    Smoking status: Never    Smokeless tobacco: Never   Vaping Use    Vaping status: Never Used   Substance and Sexual Activity    Alcohol use: Yes     Alcohol/week: 14.0 standard drinks of alcohol     Types: 14 Glasses of wine per week    Drug use: Not Currently    Sexual activity: Not Currently   [4] No family history on file.  [5]   Social History  Tobacco Use    Smoking status: Never    Smokeless tobacco: Never   Vaping Use    Vaping status: Never Used   Substance and Sexual Activity    Alcohol use: Yes     Alcohol/week: 14.0 standard drinks of alcohol     Types: 14 Glasses of wine per week    Drug use: Not Currently    Sexual activity: Not Currently

## 2025-05-22 NOTE — PLAN OF CARE
Problem: PAIN - ADULT  Goal: Verbalizes/displays adequate comfort level or baseline comfort level  Description: Interventions:  - Encourage patient to monitor pain and request assistance  - Assess pain using appropriate pain scale  - Administer analgesics as ordered based on type and severity of pain and evaluate response  - Implement non-pharmacological measures as appropriate and evaluate response  - Consider cultural and social influences on pain and pain management  - Notify physician/advanced practitioner if interventions unsuccessful or patient reports new pain  - Educate patient/family on pain management process including their role and importance of  reporting pain   - Provide non-pharmacologic/complimentary pain relief interventions  Outcome: Progressing     Problem: SAFETY ADULT  Goal: Maintain or return to baseline ADL function  Description: INTERVENTIONS:  -  Assess patient's ability to carry out ADLs; assess patient's baseline for ADL function and identify physical deficits which impact ability to perform ADLs (bathing, care of mouth/teeth, toileting, grooming, dressing, etc.)  - Assess/evaluate cause of self-care deficits   - Assess range of motion  - Assess patient's mobility; develop plan if impaired  - Assess patient's need for assistive devices and provide as appropriate  - Encourage maximum independence but intervene and supervise when necessary  - Involve family in performance of ADLs  - Assess for home care needs following discharge   - Consider OT consult to assist with ADL evaluation and planning for discharge  - Provide patient education as appropriate  - Monitor functional capacity and physical performance, use of AM PAC & JH-HLM   - Monitor gait, balance and fatigue with ambulation    Outcome: Progressing     Problem: Knowledge Deficit  Goal: Patient/family/caregiver demonstrates understanding of disease process, treatment plan, medications, and discharge instructions  Description:  Complete learning assessment and assess knowledge base.  Interventions:  - Provide teaching at level of understanding  - Provide teaching via preferred learning methods  Outcome: Progressing

## 2025-05-22 NOTE — ASSESSMENT & PLAN NOTE
- Patient was referred to the ED due to critical blood work with a HGB of 4.9.   - Patient reports to fatigue, shortness of breath, and weakness over the past several months.  - Iron level low.  - Patient was transfused 2 units of PRBCs and will be receiving an additional unit this am.  - No obvious melena or rectal bleeding but patient reports limited evaluation given her macular degeneration.  - Last EGD and colonoscopy were 10 years ago.  - Monitor H&H.  - Supportive care measures, Venofer per SLIM.  - PPI per SLIM.  - Will check celiac serology.  - Will plan for EGD and colonoscopy tomorrow to investigate.  Clear liquid diet today and Golytely prep. NPO after midnight.

## 2025-05-22 NOTE — ASSESSMENT & PLAN NOTE
Recent Labs     05/21/25  1423 05/21/25  2307 05/22/25  0454   HGB 4.9* 7.8* 6.8*     Lab Results   Component Value Date    IRON <10 (L) 05/21/2025    FERRITIN 7 (L) 05/21/2025    TIBC 567 (H) 05/21/2025    CONCFE  05/21/2025      Comment:      Unable to Calculate.     Patient sent to the ED by her PCP due to critical lab work.    Hemoglobin 4.9 on arrival  Last colonoscopy 5/2015 and per patient it was normal  CT abd pelvis 5/21 without overt source of DOMINIQUE. Showing diverticulosis.   Received 2 units of PRBC in the ED 5/21  S/p another 1U PRBC 5/22.  With significant MATILDE and ferritin 7, initiated venofer infusion..  Suspicion for GI source of bleeding.    Monitor and transfuse for Hgb <7. Maintain active T&S  GI team consulted  NPO after midnight for EGD/colonoscopy 5/23  Started PPI every 12 hours for the possible upper GI involvement.

## 2025-05-22 NOTE — ASSESSMENT & PLAN NOTE
Hyponatremia with sodium 130.  Unsure if it is in the setting of poor oral intake  Improving today 133  Trend BMP

## 2025-05-22 NOTE — PLAN OF CARE
Problem: HEMATOLOGIC - ADULT  Goal: Maintains hematologic stability  Description: INTERVENTIONS  - Assess for signs and symptoms of bleeding or hemorrhage  - Monitor labs  - Administer supportive blood products/factors as ordered and appropriate  Outcome: Progressing     Problem: PAIN - ADULT  Goal: Verbalizes/displays adequate comfort level or baseline comfort level  Description: Interventions:  - Encourage patient to monitor pain and request assistance  - Assess pain using appropriate pain scale  - Administer analgesics as ordered based on type and severity of pain and evaluate response  - Implement non-pharmacological measures as appropriate and evaluate response  - Consider cultural and social influences on pain and pain management  - Notify physician/advanced practitioner if interventions unsuccessful or patient reports new pain  - Educate patient/family on pain management process including their role and importance of  reporting pain   - Provide non-pharmacologic/complimentary pain relief interventions  Outcome: Progressing

## 2025-05-22 NOTE — PROGRESS NOTES
Progress Note - Hospitalist   Name: Bre Staton 75 y.o. female I MRN: [id number]  Unit/Bed#: -01 I Date of Admission: 5/21/2025   Date of Service: 5/22/2025 I Hospital Day: 1    Assessment & Plan  Acute anemia  Recent Labs     05/21/25  1423 05/21/25  2307 05/22/25  0454   HGB 4.9* 7.8* 6.8*     Lab Results   Component Value Date    IRON <10 (L) 05/21/2025    FERRITIN 7 (L) 05/21/2025    TIBC 567 (H) 05/21/2025    CONCFE  05/21/2025      Comment:      Unable to Calculate.     Patient sent to the ED by her PCP due to critical lab work.    Hemoglobin 4.9 on arrival  Last colonoscopy 5/2015 and per patient it was normal  CT abd pelvis 5/21 without overt source of DOMINIQUE. Showing diverticulosis.   Received 2 units of PRBC in the ED 5/21  S/p another 1U PRBC 5/22.  With significant MATILDE and ferritin 7, initiated venofer infusion..  Suspicion for GI source of bleeding.    Monitor and transfuse for Hgb <7. Maintain active T&S  GI team consulted  NPO after midnight for EGD/colonoscopy 5/23  Started PPI every 12 hours for the possible upper GI involvement.    Hyponatremia  Hyponatremia with sodium 130.  Unsure if it is in the setting of poor oral intake  Improving today 133  Trend BMP  Essential hypertension  Patient on felodipine 5 mg daily and losartan 50 mg daily.  Continue home dose  Asthma, moderate persistent  Does not appear to be in acute exacerbation.  Recommended as needed albuterol for shortness of breath.  Exudative age-related macular degeneration of right eye with active choroidal neovascularization (HCC)  Follows with ophthalmology team and receives injection.  Thrombocytosis  Thrombocytosis likely reactive from anemia.  Continue with management for acute anemia    VTE Pharmacologic Prophylaxis:   Moderate Risk (Score 3-4) - Pharmacological DVT Prophylaxis Contraindicated. Sequential Compression Devices Ordered.    Mobility:   Basic Mobility Inpatient Raw Score: 23  -HLM Goal: 7: Walk 25 feet or  more  JH-HLM Achieved: 7: Walk 25 feet or more  JH-HLM Goal achieved. Continue to encourage appropriate mobility.    Patient Centered Rounds: I performed bedside rounds with nursing staff today.   Discussions with Specialists or Other Care Team Provider: CM, GI    Education and Discussions with Family / Patient: Updated  () at bedside.    Current Length of Stay: 1 day(s)  Current Patient Status: Inpatient   Certification Statement: The patient will continue to require additional inpatient hospital stay due to EGD/colonoscopy tomorrow.  Discharge Plan: Anticipate discharge in 24-48 hrs to home.    Code Status: Level 1 - Full Code    Subjective   Patient states she's been feeling SOB, leg pains, weakness for several months since having COVID and thought it was post viral however consistent with MATILDE. Suspect this has been ongoing for longer than detected.    Objective :  Temp:  [97.8 °F (36.6 °C)-99.1 °F (37.3 °C)] 99.1 °F (37.3 °C)  HR:  [78-87] 78  BP: (113-162)/(70-95) 141/73  Resp:  [16-22] 18  SpO2:  [92 %-100 %] 95 %  O2 Device: None (Room air)    Body mass index is 25.23 kg/m².     Input and Output Summary (last 24 hours):     Intake/Output Summary (Last 24 hours) at 5/22/2025 0801  Last data filed at 5/22/2025 0700  Gross per 24 hour   Intake 797.08 ml   Output --   Net 797.08 ml       Physical Exam  Vitals reviewed.   Constitutional:       General: She is not in acute distress.     Appearance: Normal appearance. She is not toxic-appearing.   HENT:      Head: Normocephalic and atraumatic.     Eyes:      General: No scleral icterus.      Cardiovascular:      Rate and Rhythm: Normal rate and regular rhythm.      Heart sounds: Normal heart sounds.   Pulmonary:      Effort: Pulmonary effort is normal. No respiratory distress.      Breath sounds: Normal breath sounds. No stridor. No wheezing, rhonchi or rales.   Abdominal:      General: Abdomen is flat. Bowel sounds are normal. There is no  distension.      Palpations: Abdomen is soft.      Tenderness: There is no abdominal tenderness. There is no guarding or rebound.     Musculoskeletal:         General: Normal range of motion.      Cervical back: Normal range of motion.      Right lower leg: No edema.      Left lower leg: No edema.     Skin:     Coloration: Skin is pale. Skin is not jaundiced.     Neurological:      General: No focal deficit present.      Mental Status: She is alert and oriented to person, place, and time. Mental status is at baseline.                   Lab Results: I have reviewed the following results:   Results from last 7 days   Lab Units 05/22/25  0454 05/21/25  2307 05/21/25  1423   WBC Thousand/uL 9.93  --  10.34*   HEMOGLOBIN g/dL 6.8*   < > 4.9*   HEMATOCRIT % 22.4*  --  17.8*   PLATELETS Thousands/uL 382  --  501*   SEGS PCT %  --   --  70   LYMPHO PCT %  --   --  15   MONO PCT %  --   --  11   EOS PCT %  --   --  2    < > = values in this interval not displayed.     Results from last 7 days   Lab Units 05/22/25  0454 05/21/25  1423   SODIUM mmol/L 133* 130*   POTASSIUM mmol/L 3.8 3.7   CHLORIDE mmol/L 104 99   CO2 mmol/L 21 22   BUN mg/dL 6 7   CREATININE mg/dL 0.36* 0.45*   ANION GAP mmol/L 8 9   CALCIUM mg/dL 8.7 9.1   ALBUMIN g/dL  --  4.2   TOTAL BILIRUBIN mg/dL  --  0.62   ALK PHOS U/L  --  91   ALT U/L  --  17   AST U/L  --  19   GLUCOSE RANDOM mg/dL 97 103     Results from last 7 days   Lab Units 05/21/25  1423   INR  1.10             Results from last 7 days   Lab Units 05/21/25  1433   LACTIC ACID mmol/L 1.5       Recent Cultures (last 7 days):         Imaging Results Review: I reviewed radiology reports from this admission including: CT abdomen/pelvis.  Other Study Results Review: No additional pertinent studies reviewed.    Last 24 Hours Medication List:     Current Facility-Administered Medications:     ALPRAZolam (XANAX) tablet 0.25 mg, TID PRN    budesonide-formoterol (SYMBICORT) 160-4.5 mcg/act inhaler 2  puff, BID    felodipine (PLENDIL) 24 hr tablet 5 mg, QAM    gabapentin (NEURONTIN) capsule 100 mg, HS    iron sucrose (VENOFER) 300 mg in sodium chloride 0.9 % 250 mL IVPB, Daily    losartan (COZAAR) tablet 50 mg, QAM    pantoprazole (PROTONIX) injection 40 mg, Q12H TAYLOR    Administrative Statements   Today, Patient Was Seen By: Melva Ponce PA-C    **Please Note: This note may have been constructed using a voice recognition system.**

## 2025-05-23 ENCOUNTER — ANESTHESIA EVENT (INPATIENT)
Dept: GASTROENTEROLOGY | Facility: HOSPITAL | Age: 75
DRG: 812 | End: 2025-05-23
Payer: MEDICARE

## 2025-05-23 ENCOUNTER — ANESTHESIA (INPATIENT)
Dept: GASTROENTEROLOGY | Facility: HOSPITAL | Age: 75
DRG: 812 | End: 2025-05-23
Payer: MEDICARE

## 2025-05-23 ENCOUNTER — APPOINTMENT (INPATIENT)
Dept: GASTROENTEROLOGY | Facility: HOSPITAL | Age: 75
DRG: 812 | End: 2025-05-23
Attending: PHYSICIAN ASSISTANT
Payer: MEDICARE

## 2025-05-23 PROBLEM — D75.839 THROMBOCYTOSIS: Status: RESOLVED | Noted: 2025-05-21 | Resolved: 2025-05-23

## 2025-05-23 PROBLEM — E87.1 HYPONATREMIA: Status: RESOLVED | Noted: 2025-05-21 | Resolved: 2025-05-23

## 2025-05-23 LAB
ABO GROUP BLD BPU: NORMAL
ANION GAP SERPL CALCULATED.3IONS-SCNC: 9 MMOL/L (ref 4–13)
BPU ID: NORMAL
BUN SERPL-MCNC: 4 MG/DL (ref 5–25)
CALCIUM SERPL-MCNC: 8.5 MG/DL (ref 8.4–10.2)
CHLORIDE SERPL-SCNC: 105 MMOL/L (ref 96–108)
CO2 SERPL-SCNC: 23 MMOL/L (ref 21–32)
CREAT SERPL-MCNC: 0.42 MG/DL (ref 0.6–1.3)
CROSSMATCH: NORMAL
ERYTHROCYTE [DISTWIDTH] IN BLOOD BY AUTOMATED COUNT: 19.1 % (ref 11.6–15.1)
GFR SERPL CREATININE-BSD FRML MDRD: 102 ML/MIN/1.73SQ M
GLUCOSE SERPL-MCNC: 95 MG/DL (ref 65–140)
HCT VFR BLD AUTO: 25.5 % (ref 34.8–46.1)
HGB BLD-MCNC: 7.8 G/DL (ref 11.5–15.4)
HGB BLD-MCNC: 8.1 G/DL (ref 11.5–15.4)
HGB BLD-MCNC: 8.3 G/DL (ref 11.5–15.4)
IGA SERPL-MCNC: 230 MG/DL (ref 66–433)
MAGNESIUM SERPL-MCNC: 1.9 MG/DL (ref 1.9–2.7)
MCH RBC QN AUTO: 23.5 PG (ref 26.8–34.3)
MCHC RBC AUTO-ENTMCNC: 30.6 G/DL (ref 31.4–37.4)
MCV RBC AUTO: 77 FL (ref 82–98)
OSMOLALITY UR/SERPL-RTO: 280 MMOL/KG (ref 282–298)
PLATELET # BLD AUTO: 374 THOUSANDS/UL (ref 149–390)
PMV BLD AUTO: 8.5 FL (ref 8.9–12.7)
POTASSIUM SERPL-SCNC: 3.1 MMOL/L (ref 3.5–5.3)
RBC # BLD AUTO: 3.32 MILLION/UL (ref 3.81–5.12)
SODIUM SERPL-SCNC: 137 MMOL/L (ref 135–147)
UNIT DISPENSE STATUS: NORMAL
UNIT PRODUCT CODE: NORMAL
UNIT PRODUCT VOLUME: 350 ML
UNIT RH: NORMAL
WBC # BLD AUTO: 9.51 THOUSAND/UL (ref 4.31–10.16)

## 2025-05-23 PROCEDURE — 99232 SBSQ HOSP IP/OBS MODERATE 35: CPT | Performed by: NURSE PRACTITIONER

## 2025-05-23 PROCEDURE — 83735 ASSAY OF MAGNESIUM: CPT

## 2025-05-23 PROCEDURE — 43235 EGD DIAGNOSTIC BRUSH WASH: CPT | Performed by: INTERNAL MEDICINE

## 2025-05-23 PROCEDURE — 86364 TISS TRNSGLTMNASE EA IG CLAS: CPT | Performed by: PHYSICIAN ASSISTANT

## 2025-05-23 PROCEDURE — 45378 DIAGNOSTIC COLONOSCOPY: CPT | Performed by: INTERNAL MEDICINE

## 2025-05-23 PROCEDURE — 85027 COMPLETE CBC AUTOMATED: CPT

## 2025-05-23 PROCEDURE — 82784 ASSAY IGA/IGD/IGG/IGM EACH: CPT | Performed by: PHYSICIAN ASSISTANT

## 2025-05-23 PROCEDURE — 0DJ08ZZ INSPECTION OF UPPER INTESTINAL TRACT, VIA NATURAL OR ARTIFICIAL OPENING ENDOSCOPIC: ICD-10-PCS | Performed by: INTERNAL MEDICINE

## 2025-05-23 PROCEDURE — 0DJD8ZZ INSPECTION OF LOWER INTESTINAL TRACT, VIA NATURAL OR ARTIFICIAL OPENING ENDOSCOPIC: ICD-10-PCS | Performed by: INTERNAL MEDICINE

## 2025-05-23 PROCEDURE — 85018 HEMOGLOBIN: CPT

## 2025-05-23 PROCEDURE — 80048 BASIC METABOLIC PNL TOTAL CA: CPT

## 2025-05-23 PROCEDURE — C1886 CATHETER, ABLATION: HCPCS

## 2025-05-23 RX ORDER — PROPOFOL 10 MG/ML
INJECTION, EMULSION INTRAVENOUS AS NEEDED
Status: DISCONTINUED | OUTPATIENT
Start: 2025-05-23 | End: 2025-05-23

## 2025-05-23 RX ORDER — SODIUM CHLORIDE, SODIUM LACTATE, POTASSIUM CHLORIDE, CALCIUM CHLORIDE 600; 310; 30; 20 MG/100ML; MG/100ML; MG/100ML; MG/100ML
INJECTION, SOLUTION INTRAVENOUS CONTINUOUS PRN
Status: DISCONTINUED | OUTPATIENT
Start: 2025-05-23 | End: 2025-05-23

## 2025-05-23 RX ORDER — POTASSIUM CHLORIDE 1500 MG/1
40 TABLET, EXTENDED RELEASE ORAL 2 TIMES DAILY
Status: COMPLETED | OUTPATIENT
Start: 2025-05-23 | End: 2025-05-23

## 2025-05-23 RX ORDER — ESMOLOL HYDROCHLORIDE 10 MG/ML
INJECTION INTRAVENOUS AS NEEDED
Status: DISCONTINUED | OUTPATIENT
Start: 2025-05-23 | End: 2025-05-23

## 2025-05-23 RX ORDER — SODIUM CHLORIDE, SODIUM LACTATE, POTASSIUM CHLORIDE, CALCIUM CHLORIDE 600; 310; 30; 20 MG/100ML; MG/100ML; MG/100ML; MG/100ML
125 INJECTION, SOLUTION INTRAVENOUS CONTINUOUS
Status: DISCONTINUED | OUTPATIENT
Start: 2025-05-23 | End: 2025-05-23

## 2025-05-23 RX ORDER — LIDOCAINE HYDROCHLORIDE 20 MG/ML
INJECTION, SOLUTION EPIDURAL; INFILTRATION; INTRACAUDAL; PERINEURAL AS NEEDED
Status: DISCONTINUED | OUTPATIENT
Start: 2025-05-23 | End: 2025-05-23

## 2025-05-23 RX ADMIN — PANTOPRAZOLE SODIUM 40 MG: 40 INJECTION, POWDER, FOR SOLUTION INTRAVENOUS at 09:27

## 2025-05-23 RX ADMIN — BUDESONIDE AND FORMOTEROL FUMARATE DIHYDRATE 2 PUFF: 160; 4.5 AEROSOL RESPIRATORY (INHALATION) at 18:17

## 2025-05-23 RX ADMIN — PROPOFOL 50 MG: 10 INJECTION, EMULSION INTRAVENOUS at 13:36

## 2025-05-23 RX ADMIN — PROPOFOL 50 MG: 10 INJECTION, EMULSION INTRAVENOUS at 13:23

## 2025-05-23 RX ADMIN — PROPOFOL 100 MG: 10 INJECTION, EMULSION INTRAVENOUS at 13:18

## 2025-05-23 RX ADMIN — DEXTRAN 70, GLYCERIN, HYPROMELLOSE 1 DROP: 1; 2; 3 SOLUTION/ DROPS OPHTHALMIC at 15:02

## 2025-05-23 RX ADMIN — PROPOFOL 50 MG: 10 INJECTION, EMULSION INTRAVENOUS at 13:33

## 2025-05-23 RX ADMIN — ALPRAZOLAM 0.25 MG: 0.25 TABLET ORAL at 21:21

## 2025-05-23 RX ADMIN — ACETAMINOPHEN 650 MG: 325 TABLET ORAL at 17:56

## 2025-05-23 RX ADMIN — DEXTRAN 70, GLYCERIN, HYPROMELLOSE 1 DROP: 1; 2; 3 SOLUTION/ DROPS OPHTHALMIC at 17:57

## 2025-05-23 RX ADMIN — POTASSIUM CHLORIDE 40 MEQ: 1500 TABLET, EXTENDED RELEASE ORAL at 17:57

## 2025-05-23 RX ADMIN — PANTOPRAZOLE SODIUM 40 MG: 40 INJECTION, POWDER, FOR SOLUTION INTRAVENOUS at 21:22

## 2025-05-23 RX ADMIN — LOSARTAN POTASSIUM 50 MG: 50 TABLET, FILM COATED ORAL at 09:28

## 2025-05-23 RX ADMIN — ESMOLOL HYDROCHLORIDE 40 MG: 100 INJECTION, SOLUTION INTRAVENOUS at 13:24

## 2025-05-23 RX ADMIN — DEXTRAN 70, GLYCERIN, HYPROMELLOSE 1 DROP: 1; 2; 3 SOLUTION/ DROPS OPHTHALMIC at 16:15

## 2025-05-23 RX ADMIN — DEXTRAN 70, GLYCERIN, HYPROMELLOSE 1 DROP: 1; 2; 3 SOLUTION/ DROPS OPHTHALMIC at 00:02

## 2025-05-23 RX ADMIN — FELODIPINE 5 MG: 2.5 TABLET, EXTENDED RELEASE ORAL at 10:22

## 2025-05-23 RX ADMIN — BUDESONIDE AND FORMOTEROL FUMARATE DIHYDRATE 2 PUFF: 160; 4.5 AEROSOL RESPIRATORY (INHALATION) at 09:32

## 2025-05-23 RX ADMIN — ALPRAZOLAM 0.25 MG: 0.25 TABLET ORAL at 10:22

## 2025-05-23 RX ADMIN — LIDOCAINE HYDROCHLORIDE 100 MG: 20 INJECTION, SOLUTION EPIDURAL; INFILTRATION; INTRACAUDAL; PERINEURAL at 13:18

## 2025-05-23 RX ADMIN — Medication 1 CAPSULE: at 09:31

## 2025-05-23 RX ADMIN — SODIUM CHLORIDE, SODIUM LACTATE, POTASSIUM CHLORIDE, AND CALCIUM CHLORIDE: .6; .31; .03; .02 INJECTION, SOLUTION INTRAVENOUS at 12:32

## 2025-05-23 RX ADMIN — DEXTRAN 70, GLYCERIN, HYPROMELLOSE 1 DROP: 1; 2; 3 SOLUTION/ DROPS OPHTHALMIC at 08:15

## 2025-05-23 RX ADMIN — PROPOFOL 50 MG: 10 INJECTION, EMULSION INTRAVENOUS at 13:30

## 2025-05-23 RX ADMIN — DEXTRAN 70, GLYCERIN, HYPROMELLOSE 1 DROP: 1; 2; 3 SOLUTION/ DROPS OPHTHALMIC at 22:00

## 2025-05-23 RX ADMIN — PROPOFOL 50 MG: 10 INJECTION, EMULSION INTRAVENOUS at 13:20

## 2025-05-23 RX ADMIN — IRON SUCROSE 300 MG: 20 INJECTION, SOLUTION INTRAVENOUS at 14:55

## 2025-05-23 RX ADMIN — GABAPENTIN 100 MG: 100 CAPSULE ORAL at 21:21

## 2025-05-23 RX ADMIN — PROPOFOL 50 MG: 10 INJECTION, EMULSION INTRAVENOUS at 13:27

## 2025-05-23 RX ADMIN — Medication 1 CAPSULE: at 21:22

## 2025-05-23 RX ADMIN — DEXTRAN 70, GLYCERIN, HYPROMELLOSE 1 DROP: 1; 2; 3 SOLUTION/ DROPS OPHTHALMIC at 05:40

## 2025-05-23 RX ADMIN — POTASSIUM CHLORIDE 40 MEQ: 1500 TABLET, EXTENDED RELEASE ORAL at 09:28

## 2025-05-23 RX ADMIN — ALPRAZOLAM 0.25 MG: 0.25 TABLET ORAL at 17:56

## 2025-05-23 RX ADMIN — PROPOFOL 50 MG: 10 INJECTION, EMULSION INTRAVENOUS at 13:40

## 2025-05-23 RX ADMIN — DEXTRAN 70, GLYCERIN, HYPROMELLOSE 1 DROP: 1; 2; 3 SOLUTION/ DROPS OPHTHALMIC at 20:00

## 2025-05-23 NOTE — ANESTHESIA PREPROCEDURE EVALUATION
Procedure:  COLONOSCOPY  EGD    Relevant Problems   CARDIO   (+) Dyslipidemia, goal LDL below 130   (+) Essential hypertension      HEMATOLOGY   (+) Acute anemia   (+) Iron deficiency anemia      PULMONARY   (+) Asthma, moderate persistent        Physical Exam    Airway     Mallampati score: III  TM Distance: >3 FB  Neck ROM: full      Cardiovascular  Cardiovascular exam normal    Dental       Pulmonary  Pulmonary exam normal     Neurological      Other Findings  post-pubertal.      Anesthesia Plan  ASA Score- 3 Emergent    Anesthesia Type- IV sedation with anesthesia with ASA Monitors.         Additional Monitors:     Airway Plan:            Plan Factors-Exercise tolerance (METS): >4 METS.    Chart reviewed. EKG reviewed. Imaging results reviewed. Existing labs reviewed. Patient summary reviewed.                  Induction- intravenous.    Postoperative Plan- .   Monitoring Plan - Monitoring plan - standard ASA monitoring      Perioperative Resuscitation Plan - Level 1 - Full Code.       Informed Consent- Anesthetic plan and risks discussed with patient.  I personally reviewed this patient with the CRNA. Discussed and agreed on the Anesthesia Plan with the CRNA..      NPO Status:  Vitals Value Taken Time   Date of last liquid 05/23/25 05/23/25 11:29   Time of last liquid 0800 05/23/25 11:29   Date of last solid 05/21/25 05/23/25 11:29   Time of last solid 1800 05/23/25 11:29

## 2025-05-23 NOTE — ASSESSMENT & PLAN NOTE
Patient reported to the ED at the discretion of her PCP after obtaining OP labs for ongoing feelings of fatigue and exhaustion after returning from [place].  Hemoglobin at time of admission was 4.9  Last lab work prior to this was approx 6 months prior, per patient report.  She received a total of 3 units of PRBC, hemoglobin today is stable at 8.3  Further work-up revealed iron deficiency anemia  Iron <10, Ferritin 7, TIBC 567  Initiated on IV Venofer  GI Consult  Plan for EGD/Colonoscopy  Patient does report history of a bleeding ulcer in the past.  Continue with IV Protonix 40 mg BID

## 2025-05-23 NOTE — ASSESSMENT & PLAN NOTE
Blood pressure stable, 157/74  Continue with home medication regimen with hold parameters  Monitor vital signs per unit routine.

## 2025-05-23 NOTE — PROGRESS NOTES
Progress Note - Hospitalist   Name: Bre Satton 75 y.o. female I MRN: [id number]  Unit/Bed#: -01 I Date of Admission: 5/21/2025   Date of Service: 5/23/2025 I Hospital Day: 2    Assessment & Plan  Acute anemia  Patient reported to the ED at the discretion of her PCP after obtaining OP labs for ongoing feelings of fatigue and exhaustion after returning from [place].  Hemoglobin at time of admission was 4.9  Last lab work prior to this was approx 6 months prior, per patient report.  She received a total of 3 units of PRBC, hemoglobin today is stable at 8.3  Further work-up revealed iron deficiency anemia  Iron <10, Ferritin 7, TIBC 567  Initiated on IV Venofer  GI Consult  Plan for EGD/Colonoscopy  Patient does report history of a bleeding ulcer in the past.  Continue with IV Protonix 40 mg BID    Hyponatremia (Resolved: 5/23/2025)  Resolved  Essential hypertension  Blood pressure stable, 157/74  Continue with home medication regimen with hold parameters  Monitor vital signs per unit routine.  Asthma, moderate persistent  Not in acute exacerbation  Exudative age-related macular degeneration of right eye with active choroidal neovascularization (HCC)  Follows with ophthalmology team and receives injection.  Thrombocytosis (Resolved: 5/23/2025)  Thrombocytosis likely reactive from anemia.  Continue with management for acute anemia  Resolved    VTE Pharmacologic Prophylaxis:   Moderate Risk (Score 3-4) - Pharmacological DVT Prophylaxis Contraindicated. Sequential Compression Devices Ordered.    Mobility:   Basic Mobility Inpatient Raw Score: 23  JH-HLM Goal: 7: Walk 25 feet or more  JH-HLM Achieved: 7: Walk 25 feet or more  JH-HLM Goal achieved. Continue to encourage appropriate mobility.    Patient Centered Rounds: I performed bedside rounds with nursing staff today.   Discussions with Specialists or Other Care Team Provider: Case Management, GI    Education and Discussions with Family / Patient: Updated contact  person () at bedside.    Current Length of Stay: 2 day(s)  Current Patient Status: Inpatient   Certification Statement: The patient will continue to require additional inpatient hospital stay due to ongoing treatment in setting of acute anemia, need for egd/colonoscopy today.  Discharge Plan: Anticipate discharge in 24-48 hrs to home.    Code Status: Level 1 - Full Code    Subjective   Patient resting in bed,  at bedside.  No complaints of chest pain, shortness of breath, fever or chills.  Denies abdominal pain.    Objective :  Temp:  [98.1 °F (36.7 °C)-98.6 °F (37 °C)] 98.1 °F (36.7 °C)  HR:  [76-82] 77  BP: (123-157)/(74-83) 157/74  Resp:  [16-24] 24  SpO2:  [93 %-98 %] 98 %  O2 Device: None (Room air)    Body mass index is 25.23 kg/m².     Input and Output Summary (last 24 hours):     Intake/Output Summary (Last 24 hours) at 5/23/2025 1211  Last data filed at 5/22/2025 1750  Gross per 24 hour   Intake 1316.25 ml   Output 150 ml   Net 1166.25 ml       Physical Exam  Vitals and nursing note reviewed.   Constitutional:       General: She is not in acute distress.     Appearance: She is normal weight. She is ill-appearing.     Cardiovascular:      Rate and Rhythm: Normal rate.      Pulses: Normal pulses.   Pulmonary:      Effort: Pulmonary effort is normal.   Abdominal:      General: Bowel sounds are normal. There is no distension.      Palpations: Abdomen is soft.      Tenderness: There is no abdominal tenderness.     Musculoskeletal:         General: Normal range of motion.      Right lower leg: No edema.      Left lower leg: No edema.     Skin:     General: Skin is warm and dry.      Capillary Refill: Capillary refill takes less than 2 seconds.     Neurological:      Mental Status: She is alert and oriented to person, place, and time.     Psychiatric:         Mood and Affect: Mood is anxious. Affect is tearful.           Lines/Drains:        Lab Results: I have reviewed the following results:    Results from last 7 days   Lab Units 05/23/25  0820 05/23/25  0441 05/21/25  2307 05/21/25  1423   WBC Thousand/uL  --  9.51   < > 10.34*   HEMOGLOBIN g/dL 8.3* 7.8*   < > 4.9*   HEMATOCRIT %  --  25.5*   < > 17.8*   PLATELETS Thousands/uL  --  374   < > 501*   SEGS PCT %  --   --   --  70   LYMPHO PCT %  --   --   --  15   MONO PCT %  --   --   --  11   EOS PCT %  --   --   --  2    < > = values in this interval not displayed.     Results from last 7 days   Lab Units 05/23/25  0441 05/22/25  0454 05/21/25  1423   SODIUM mmol/L 137   < > 130*   POTASSIUM mmol/L 3.1*   < > 3.7   CHLORIDE mmol/L 105   < > 99   CO2 mmol/L 23   < > 22   BUN mg/dL 4*   < > 7   CREATININE mg/dL 0.42*   < > 0.45*   ANION GAP mmol/L 9   < > 9   CALCIUM mg/dL 8.5   < > 9.1   ALBUMIN g/dL  --   --  4.2   TOTAL BILIRUBIN mg/dL  --   --  0.62   ALK PHOS U/L  --   --  91   ALT U/L  --   --  17   AST U/L  --   --  19   GLUCOSE RANDOM mg/dL 95   < > 103    < > = values in this interval not displayed.     Results from last 7 days   Lab Units 05/21/25  1423   INR  1.10             Results from last 7 days   Lab Units 05/21/25  1433   LACTIC ACID mmol/L 1.5       Recent Cultures (last 7 days):         Imaging Results Review: No pertinent imaging studies reviewed.  Other Study Results Review: No additional pertinent studies reviewed.    Last 24 Hours Medication List:     Current Facility-Administered Medications:     acetaminophen (TYLENOL) tablet 650 mg, Q6H PRN    ALPRAZolam (XANAX) tablet 0.25 mg, TID PRN    Artificial Tears Op Soln 1 drop, Q2H    budesonide-formoterol (SYMBICORT) 160-4.5 mcg/act inhaler 2 puff, BID    felodipine (PLENDIL) 24 hr tablet 5 mg, QAM    gabapentin (NEURONTIN) capsule 100 mg, HS    iron sucrose (VENOFER) 300 mg in sodium chloride 0.9 % 250 mL IVPB, Daily, Last Rate: 300 mg (05/22/25 1420)    losartan (COZAAR) tablet 50 mg, QAM    pantoprazole (PROTONIX) injection 40 mg, Q12H TAYLOR    potassium chloride (Klor-Con M20) CR  tablet 40 mEq, BID    PreserVision AREDS 2 CAPS 1 capsule, BID    Administrative Statements   Today, Patient Was Seen By: PENNY Gunderson  I have spent a total time of 42 minutes in caring for this patient on the day of the visit/encounter including Diagnostic results, Risks and benefits of tx options, Instructions for management, Patient and family education, Importance of tx compliance, Risk factor reductions, Impressions, Counseling / Coordination of care, Documenting in the medical record, Reviewing/placing orders in the medical record (including tests, medications, and/or procedures), Obtaining or reviewing history  , and Communicating with other healthcare professionals .    **Please Note: This note may have been constructed using a voice recognition system.**

## 2025-05-23 NOTE — ANESTHESIA POSTPROCEDURE EVALUATION
Post-Op Assessment Note    CV Status:  Stable  Pain Score: 0    Pain management: adequate       Mental Status:  Alert   Hydration Status:  Stable   PONV Controlled:  None   Airway Patency:  Patent     Post Op Vitals Reviewed: Yes    No anethesia notable event occurred.    Staff: CRNA           Last Filed PACU Vitals:  Vitals Value Taken Time   Temp 97.8 °F (36.6 °C) 05/23/25 13:51   Pulse 78 05/23/25 13:51   /70 05/23/25 13:51   Resp 18 05/23/25 13:51   SpO2 93 % 05/23/25 13:51       Modified Waylon:     Vitals Value Taken Time   Activity 2 05/23/25 13:51   Respiration 2 05/23/25 13:51   Circulation 2 05/23/25 13:51   Consciousness 2 05/23/25 13:51   Oxygen Saturation 2 05/23/25 13:51     Modified Waylon Score: 10

## 2025-05-24 VITALS
BODY MASS INDEX: 25.1 KG/M2 | RESPIRATION RATE: 18 BRPM | WEIGHT: 147 LBS | HEIGHT: 64 IN | HEART RATE: 69 BPM | TEMPERATURE: 98.4 F | DIASTOLIC BLOOD PRESSURE: 80 MMHG | OXYGEN SATURATION: 96 % | SYSTOLIC BLOOD PRESSURE: 142 MMHG

## 2025-05-24 LAB
HCT VFR BLD AUTO: 25.9 % (ref 34.8–46.1)
HGB BLD-MCNC: 7.6 G/DL (ref 11.5–15.4)

## 2025-05-24 PROCEDURE — 85014 HEMATOCRIT: CPT | Performed by: INTERNAL MEDICINE

## 2025-05-24 PROCEDURE — 99239 HOSP IP/OBS DSCHRG MGMT >30: CPT | Performed by: INTERNAL MEDICINE

## 2025-05-24 PROCEDURE — 85018 HEMOGLOBIN: CPT | Performed by: INTERNAL MEDICINE

## 2025-05-24 RX ORDER — QUINIDINE GLUCONATE 324 MG
240 TABLET, EXTENDED RELEASE ORAL
Qty: 30 TABLET | Refills: 0 | Status: SHIPPED | OUTPATIENT
Start: 2025-05-24

## 2025-05-24 RX ADMIN — Medication 3 MG: at 00:16

## 2025-05-24 RX ADMIN — DEXTRAN 70, GLYCERIN, HYPROMELLOSE 1 DROP: 1; 2; 3 SOLUTION/ DROPS OPHTHALMIC at 00:22

## 2025-05-24 RX ADMIN — Medication 1 CAPSULE: at 08:06

## 2025-05-24 RX ADMIN — ALPRAZOLAM 0.25 MG: 0.25 TABLET ORAL at 08:11

## 2025-05-24 RX ADMIN — DEXTRAN 70, GLYCERIN, HYPROMELLOSE 1 DROP: 1; 2; 3 SOLUTION/ DROPS OPHTHALMIC at 04:28

## 2025-05-24 RX ADMIN — DEXTRAN 70, GLYCERIN, HYPROMELLOSE 1 DROP: 1; 2; 3 SOLUTION/ DROPS OPHTHALMIC at 06:06

## 2025-05-24 RX ADMIN — BUDESONIDE AND FORMOTEROL FUMARATE DIHYDRATE 2 PUFF: 160; 4.5 AEROSOL RESPIRATORY (INHALATION) at 08:06

## 2025-05-24 RX ADMIN — FELODIPINE 5 MG: 2.5 TABLET, EXTENDED RELEASE ORAL at 08:07

## 2025-05-24 RX ADMIN — DEXTRAN 70, GLYCERIN, HYPROMELLOSE 1 DROP: 1; 2; 3 SOLUTION/ DROPS OPHTHALMIC at 02:03

## 2025-05-24 RX ADMIN — PANTOPRAZOLE SODIUM 40 MG: 40 INJECTION, POWDER, FOR SOLUTION INTRAVENOUS at 08:07

## 2025-05-24 RX ADMIN — IRON SUCROSE 300 MG: 20 INJECTION, SOLUTION INTRAVENOUS at 08:07

## 2025-05-24 RX ADMIN — LOSARTAN POTASSIUM 50 MG: 50 TABLET, FILM COATED ORAL at 08:07

## 2025-05-24 RX ADMIN — DEXTRAN 70, GLYCERIN, HYPROMELLOSE 1 DROP: 1; 2; 3 SOLUTION/ DROPS OPHTHALMIC at 08:22

## 2025-05-24 RX ADMIN — DEXTRAN 70, GLYCERIN, HYPROMELLOSE 1 DROP: 1; 2; 3 SOLUTION/ DROPS OPHTHALMIC at 07:59

## 2025-05-24 NOTE — DISCHARGE SUMMARY
Discharge Summary - Hospitalist   Name: Bre Staton 75 y.o. female I MRN: [id number]  Unit/Bed#: -01 I Date of Admission: 5/21/2025   Date of Service: 5/24/2025 I Hospital Day: 3       Discharge Diagnosis:    Anemia  Shortness of breath and fatigue given above  Asthma  Hypertension  Hyperlipidemia    Discharging Physician / Practitioner: Vipul Ralph MD  PCP: Braden Bernal MD  Admission Date:   Admission Orders (From admission, onward)       Ordered        05/21/25 1559  INPATIENT ADMISSION  Once                          Discharge Date: 05/24/25      Consultations During Hospital Stay:  Gastroenterology    Procedures Performed:   EGD and colonoscopy    EGD  [facility] Endoscopy  [address] [address]        DATE OF SERVICE:  5/23/25     PHYSICIAN(S):  Attending:   Jp Van DO      Fellow:   No Staff Documented         INDICATION:  Anemia        POST-OP DIAGNOSIS:  See the impression below.      PREPROCEDURE:  Informed consent was obtained for the procedure, including sedation.  Risks of perforation, hemorrhage, adverse drug reaction and aspiration were discussed. The patient was placed in the left lateral decubitus position.     Patient was explained about the risks and benefits of the procedure. Risks including but not limited to bleeding, infection, and perforation were explained in detail. Also explained about less than 100% sensitivity with the exam and other alternatives.     PROCEDURE: EGD     DETAILS OF PROCEDURE:   Patient was taken to the procedure room where a time out was performed to confirm correct patient and correct procedure.   The patient underwent monitored anesthesia care, which was administered by an anesthesia professional. The patient's blood pressure, heart rate, level of consciousness, respirations, oxygen, ECG and ETCO2 were monitored throughout the procedure. The scope was introduced through the mouth and advanced to the  second part of the duodenum. Retroflexion was performed in the fundus. Prior to the procedure, the patient's H. Pylori status was unknown. The patient experienced no blood loss. The procedure was not difficult. The patient tolerated the procedure well. There were no apparent adverse events.         ANESTHESIA INFORMATION:  ASA: III  Anesthesia Type: IV Sedation with Anesthesia     MEDICATIONS:  No administrations occurring from 1315 to 1327 on 05/23/25         FINDINGS:  The cricopharynx, upper third of the esophagus, middle third of the esophagus, lower third of the esophagus, GE junction and Z-line appeared normal. Z-line is 30 cm from the incisors.  Large herniation of gastric fundus into thoracic cavity (type II hiatal hernia) without Jordy lesions present - GE junction 30 cm from the incisors, diaphragmatic impression 40 cm from the incisors:  Hill classification: Grade IV  The incisura, antrum, prepyloric region and pylorus appeared normal.  The duodenal bulb and 2nd part of the duodenum appeared normal.        SPECIMENS:  * No specimens in log *        IMPRESSION:  The cricopharynx, upper third of the esophagus, middle third of the esophagus, lower third of the esophagus, GE junction and Z-line appeared normal.  Large type II hiatal hernia  The incisura, antrum, prepyloric region and pylorus appeared normal.  The duodenal bulb and 2nd part of the duodenum appeared normal.    Colonoscopy 05/22  [facility] Endoscopy  [address] [facility] [address]     DATE OF SERVICE:  5/23/25     PHYSICIAN(S):  Attending:   Jp Van DO      Fellow:   No Staff Documented         INDICATION:  Anemia           POST-OP DIAGNOSIS:  See the impression below.     HISTORY:  Prior colonoscopy: 10 years ago.     BOWEL PREPARATION:  Golytely/Colyte/Trilyte; Dulcolax        PREPROCEDURE:  Informed consent was obtained for the procedure, including sedation. Risks including but not limited to bleeding,  infection, perforation, adverse drug reaction and aspiration were explained in detail. Also explained about less than 100% sensitivity with the exam and other alternatives. The patient was placed in the left lateral decubitus position.     Procedure: Colonoscopy      DETAILS OF PROCEDURE:   Patient was taken to the procedure room where a time out was performed to confirm correct patient and correct procedure.   The patient underwent monitored anesthesia care, which was administered by an anesthesia professional. The patient's blood pressure, ECG, ETCO2, heart rate, level of consciousness, oxygen and respirations were monitored throughout the procedure. A digital rectal exam was performed. A perianal exam was performed. The scope was introduced through the anus and advanced to the cecum. Retroflexion was performed in the rectum. The quality of bowel preparation was evaluated using the [place] Bowel Preparation Scale with scores of: right colon = 2, transverse colon = 2, left colon = 2. The total BBPS score was 6. Bowel prep was adequate. The patient experienced no blood loss. The procedure was not difficult. The patient tolerated the procedure well. There were no apparent adverse events.            ANESTHESIA INFORMATION:  ASA: III  Anesthesia Type: IV Sedation with Anesthesia     MEDICATIONS:  No administrations occurring from 1315 to 1348 on 05/23/25            FINDINGS:  Multiple medium and large, extensive severe diverticula with no inflammation containing no content in the mid transverse colon, distal transverse colon, proximal sigmoid colon, mid sigmoid colon and distal sigmoid colon; no bleeding was observed  The cecum, ascending colon, hepatic flexure, rectosigmoid, rectum and anal region appeared normal.     Outpatient follow up Requested:  PCP  GI - will benefit from video capsule endoscopy    Complications: None    Reason for Admission: Anemia    HPI:  Bre Staton is a 75 y.o. female with a PMH of asthma,  hypertension, hyperlipidemia who presents with ongoing shortness of breath, fatigue.  Patient saw her PCP 2 days ago who recommended routine blood work.  She was noted to have critical result with hemoglobin 4.9 and was sent to the ED. Patient admits that her symptoms such as shortness of breath and fatigue started since COVID-19 infection that she had in February.  Denies being on blood thinners.  Patient admits having constipation for which she has been taking as needed laxatives.  She reports her stool consistency being regular however not sure if she has dark or bloody stools.  Patient overall hemodynamically stable.  No signs of active bleeding.  Admitted for further workup for acute anemia.     Hospital Course:     The patient was hospitalized due to symptomatic anemia.  The patient underwent GI evaluation with EGD and colonoscopy which was unrevealing for acute bleeding.  Her hemoglobin most recently 7.6.  She denies any flank pain or bruise.  The patient did receive some IV iron while here I do recommend continuation of iron outpatient as well for now.  She was recommended outpatient capsule endoscopy by GI, GI cleared her for discharge.  I do recommend her to do some blood work next week to further monitor her hemoglobin, I did tell her that day-to-day fluctuations are expected in hemoglobin levels depending on her volume status but if she experiences any signs of bleeding such as lightheadedness presyncope tachycardia hypothermia or diaphoresis she should look for more prompt medical attention.  Recommend outpatient follow-up for CBC, follow-up with PCP, follow-up with GI for outpatient capsule endoscopy.  If failure to encounter anemia source in the GI tract may also consider outpatient hematology evaluation.  Given stability otherwise patient is being discharged in stable condition.  Discussed with patient  at length.  Patient discharged on 5/24.    Condition at Discharge: good     Discharge Day  "Visit / Exam:     Subjective:    Patient evaluated this morning.  The patient is doing very well, her hemoglobin today is 7.6 but she denies any bleeding flank pain or bruise.  She is actually eager to go home although slightly anxious with the hemoglobin.  I told her that some day-to-day fluctuations actually expected and given stability there is no indication for transfusion.  Significant other at the bedside.  Tolerating p.o. intake.  Vitals: Blood Pressure: 142/80 (05/24/25 0752)  Pulse: 69 (05/24/25 0752)  Temperature: 98.4 °F (36.9 °C) (05/24/25 0752)  Temp Source: Temporal (05/23/25 1351)  Respirations: 18 (05/23/25 1429)  Height: 5' 4\" (162.6 cm) (05/21/25 1730)  Weight - Scale: 66.7 kg (147 lb) (05/21/25 1730)  SpO2: 96 % (05/24/25 0752)    Exam:   Physical Exam  Vitals and nursing note reviewed.   Constitutional:       Appearance: Normal appearance. She is normal weight.      Comments: Female in bed, awake   HENT:      Head: Normocephalic and atraumatic.      Right Ear: External ear normal.      Left Ear: External ear normal.      Nose: Nose normal. No congestion.      Mouth/Throat:      Mouth: Mucous membranes are moist.      Pharynx: Oropharynx is clear. No oropharyngeal exudate or posterior oropharyngeal erythema.     Eyes:      General: No scleral icterus.        Right eye: No discharge.         Left eye: No discharge.      Extraocular Movements: Extraocular movements intact.      Conjunctiva/sclera: Conjunctivae normal.      Pupils: Pupils are equal, round, and reactive to light.       Cardiovascular:      Rate and Rhythm: Normal rate and regular rhythm.      Pulses: Normal pulses.      Heart sounds: Normal heart sounds. No murmur heard.     No friction rub. No gallop.   Pulmonary:      Effort: Pulmonary effort is normal. No respiratory distress.      Breath sounds: Normal breath sounds. No stridor. No wheezing, rhonchi or rales.   Chest:      Chest wall: No tenderness.   Abdominal:      General: " Abdomen is flat. Bowel sounds are normal. There is no distension.      Palpations: Abdomen is soft. There is no mass.      Tenderness: There is no abdominal tenderness. There is no guarding or rebound.     Musculoskeletal:         General: No swelling, tenderness, deformity or signs of injury. Normal range of motion.      Cervical back: Normal range of motion and neck supple. No rigidity. No muscular tenderness.     Skin:     General: Skin is warm and dry.      Capillary Refill: Capillary refill takes less than 2 seconds.      Coloration: Skin is pale. Skin is not jaundiced.      Findings: No bruising, erythema, lesion or rash.     Neurological:      General: No focal deficit present.      Mental Status: She is alert and oriented to person, place, and time. Mental status is at baseline.      Cranial Nerves: No cranial nerve deficit.      Sensory: No sensory deficit.      Motor: No weakness.      Coordination: Coordination normal.     Psychiatric:         Mood and Affect: Mood normal.         Behavior: Behavior normal.         Thought Content: Thought content normal.         Judgment: Judgment normal.           Discussion with Family:  at bedside    Discharge instructions/Information to patient and family:   See after visit summary for information provided to patient and family.      Provisions for Follow-Up Care:  See after visit summary for information related to follow-up care and any pertinent home health orders.      Disposition:     Home    For Discharges to [facility] SNF:   Not Applicable to this Patient - Not Applicable to this Patient    Planned Readmission: No     Discharge Statement:  I spent 86 minutes discharging the patient. This time was spent on the day of discharge. I had direct contact with the patient on the day of discharge. Greater than 50% of the total time was spent examining patient, answering all patient questions, arranging and discussing plan of care with patient as well  as directly providing post-discharge instructions.  Additional time then spent on discharge activities.    Discharge Medications:  See after visit summary for reconciled discharge medications provided to patient and family.      ** Please Note: This note has been constructed using a voice recognition system **

## 2025-05-24 NOTE — DISCHARGE INSTR - AVS FIRST PAGE
Please do bloodwork next week    Take iron supplement once a day with lunch    Follow up with GI for video capsule endoscopy

## 2025-05-27 ENCOUNTER — TRANSITIONAL CARE MANAGEMENT (OUTPATIENT)
Dept: INTERNAL MEDICINE CLINIC | Facility: CLINIC | Age: 75
End: 2025-05-27

## 2025-05-27 ENCOUNTER — PREP FOR PROCEDURE (OUTPATIENT)
Dept: GASTROENTEROLOGY | Facility: CLINIC | Age: 75
End: 2025-05-27

## 2025-05-27 ENCOUNTER — APPOINTMENT (OUTPATIENT)
Dept: LAB | Facility: HOSPITAL | Age: 75
End: 2025-05-27
Payer: MEDICARE

## 2025-05-27 DIAGNOSIS — D64.9 ANEMIA: ICD-10-CM

## 2025-05-27 DIAGNOSIS — D50.9 IRON DEFICIENCY ANEMIA, UNSPECIFIED IRON DEFICIENCY ANEMIA TYPE: Primary | ICD-10-CM

## 2025-05-27 LAB
BASOPHILS # BLD AUTO: 0.12 THOUSANDS/ÂΜL (ref 0–0.1)
BASOPHILS NFR BLD AUTO: 1 % (ref 0–1)
EOSINOPHIL # BLD AUTO: 0.21 THOUSAND/ÂΜL (ref 0–0.61)
EOSINOPHIL NFR BLD AUTO: 2 % (ref 0–6)
ERYTHROCYTE [DISTWIDTH] IN BLOOD BY AUTOMATED COUNT: 24.3 % (ref 11.6–15.1)
EST. AVERAGE GLUCOSE BLD GHB EST-MCNC: 103 MG/DL
HBA1C MFR BLD: 5.2 %
HCT VFR BLD AUTO: 35.1 % (ref 34.8–46.1)
HGB BLD-MCNC: 10.1 G/DL (ref 11.5–15.4)
IMM GRANULOCYTES # BLD AUTO: 0.05 THOUSAND/UL (ref 0–0.2)
IMM GRANULOCYTES NFR BLD AUTO: 1 % (ref 0–2)
LYMPHOCYTES # BLD AUTO: 1.27 THOUSANDS/ÂΜL (ref 0.6–4.47)
LYMPHOCYTES NFR BLD AUTO: 13 % (ref 14–44)
MCH RBC QN AUTO: 24.4 PG (ref 26.8–34.3)
MCHC RBC AUTO-ENTMCNC: 28.8 G/DL (ref 31.4–37.4)
MCV RBC AUTO: 85 FL (ref 82–98)
MONOCYTES # BLD AUTO: 0.99 THOUSAND/ÂΜL (ref 0.17–1.22)
MONOCYTES NFR BLD AUTO: 10 % (ref 4–12)
NEUTROPHILS # BLD AUTO: 7.26 THOUSANDS/ÂΜL (ref 1.85–7.62)
NEUTS SEG NFR BLD AUTO: 73 % (ref 43–75)
NRBC BLD AUTO-RTO: 0 /100 WBCS
PLATELET # BLD AUTO: 457 THOUSANDS/UL (ref 149–390)
PMV BLD AUTO: 8.7 FL (ref 8.9–12.7)
RBC # BLD AUTO: 4.14 MILLION/UL (ref 3.81–5.12)
TTG IGA SER IA-ACNC: 0.5 U/ML (ref ?–10)
WBC # BLD AUTO: 9.9 THOUSAND/UL (ref 4.31–10.16)

## 2025-05-27 PROCEDURE — 85025 COMPLETE CBC W/AUTO DIFF WBC: CPT

## 2025-05-27 PROCEDURE — 36415 COLL VENOUS BLD VENIPUNCTURE: CPT

## 2025-05-27 NOTE — ANESTHESIA POSTPROCEDURE EVALUATION
Post-Op Assessment Note    Last Filed PACU Vitals:  Vitals Value Taken Time   Temp 97.8 °F (36.6 °C) 05/23/25 13:51   Pulse 84 05/23/25 14:11   /77 05/23/25 14:11   Resp 16 05/23/25 14:11   SpO2 94 % 05/23/25 14:11       Modified Waylon:     Vitals Value Taken Time   Activity 2 05/23/25 14:17   Respiration 2 05/23/25 14:17   Circulation 2 05/23/25 14:17   Consciousness 2 05/23/25 14:17   Oxygen Saturation 2 05/23/25 14:17     Modified Waylon Score: 10

## 2025-05-29 ENCOUNTER — PROCEDURE VISIT (OUTPATIENT)
Dept: GASTROENTEROLOGY | Facility: CLINIC | Age: 75
End: 2025-05-29
Attending: INTERNAL MEDICINE
Payer: MEDICARE

## 2025-05-29 ENCOUNTER — TELEPHONE (OUTPATIENT)
Dept: GASTROENTEROLOGY | Facility: CLINIC | Age: 75
End: 2025-05-29

## 2025-05-29 DIAGNOSIS — D50.9 IRON DEFICIENCY ANEMIA, UNSPECIFIED IRON DEFICIENCY ANEMIA TYPE: ICD-10-CM

## 2025-05-29 NOTE — TELEPHONE ENCOUNTER
The patient's  called in (Alvaro listed on the comm. consent) regarding a call about rescheduling the pill cam. He wanted to advise that the patient is distraught and does not want to reschedule at this time but would like someone to call and follow up to explain why what has already been recorded can 't just be used. Please follow up with the patient's  at the contact number listed in the comm. consent form.

## 2025-05-29 NOTE — TELEPHONE ENCOUNTER
Pt's daughter called to report the VCE plug from the recorder got disconnected from the belt for approximately 15 minutes. Explained to daughter how to plug the wire back into the recorder and the blue blinking light appeared on the recorder.

## 2025-05-30 ENCOUNTER — NURSE TRIAGE (OUTPATIENT)
Age: 75
End: 2025-05-30

## 2025-05-30 NOTE — TELEPHONE ENCOUNTER
"REASON FOR CONVERSATION: Itching    SYMPTOMS: generalized itching that started about 5 days ago    OTHER HEALTH INFORMATION: pt has poor vision but states that daughter thought at one point pt had developed hives.  States she has been taking Benadryl and states she hasn't taken it yet today and has no complaints of itchiness    PROTOCOL DISPOSITION: See PCP Within 1 Week in Office (overriding See Within 3 Days in Office)    CARE ADVICE PROVIDED: Pt already has an appt scheduled with PCP on Tuesday 6/3/25 at 2:40pm.  Advised to moisturize skin and to continue benadryl if needed.  Advised to call back if no improvement or if s/s worsen.      PRACTICE FOLLOW-UP: None          Reason for Disposition   Widespread itching and cause unknown and present > 48 hours    Answer Assessment - Initial Assessment Questions  1. DESCRIPTION: \"Describe the itching you are having.\"      Itching    2. SEVERITY: \"How bad is it?\"       Intermittent    3. SCRATCHING: \"Are there any scratch marks? Bleeding?\"      Denies    4. ONSET: \"When did this begin?\"       About 5 days ago    5. CAUSE: \"What do you think is causing the itching?\" (ask about swimming pools, pollen, animals, soaps, etc.)      Unsure    6. OTHER SYMPTOMS: \"Do you have any other symptoms?\"       Denies    Protocols used: Itching - Widespread-Adult-[place]    "

## 2025-05-30 NOTE — TELEPHONE ENCOUNTER
Regarding: Itchiness  ----- Message from Dayana RASHEED sent at 5/30/2025  8:50 AM EDT -----  The following was sent via [employer]:    Dr Bernal,     This is Alvaro Staton sending a message for Bre Staton.  She is experiencing intermittent  itchiness for the last 4 days.  If she takes benadryl, it goes away.  Please advise at your earliest convenience.  Thank you.     Alvaro

## 2025-06-02 ENCOUNTER — APPOINTMENT (OUTPATIENT)
Dept: LAB | Facility: HOSPITAL | Age: 75
End: 2025-06-02
Payer: MEDICARE

## 2025-06-02 DIAGNOSIS — D64.9 ANEMIA: Primary | ICD-10-CM

## 2025-06-02 LAB
BASOPHILS # BLD AUTO: 0.13 THOUSANDS/ÂΜL (ref 0–0.1)
BASOPHILS NFR BLD AUTO: 2 % (ref 0–1)
EOSINOPHIL # BLD AUTO: 0.18 THOUSAND/ÂΜL (ref 0–0.61)
EOSINOPHIL NFR BLD AUTO: 2 % (ref 0–6)
ERYTHROCYTE [DISTWIDTH] IN BLOOD BY AUTOMATED COUNT: 27.3 % (ref 11.6–15.1)
HCT VFR BLD AUTO: 39.6 % (ref 34.8–46.1)
HGB BLD-MCNC: 11.4 G/DL (ref 11.5–15.4)
IMM GRANULOCYTES # BLD AUTO: 0.02 THOUSAND/UL (ref 0–0.2)
IMM GRANULOCYTES NFR BLD AUTO: 0 % (ref 0–2)
LYMPHOCYTES # BLD AUTO: 1.41 THOUSANDS/ÂΜL (ref 0.6–4.47)
LYMPHOCYTES NFR BLD AUTO: 17 % (ref 14–44)
MCH RBC QN AUTO: 25.2 PG (ref 26.8–34.3)
MCHC RBC AUTO-ENTMCNC: 28.8 G/DL (ref 31.4–37.4)
MCV RBC AUTO: 88 FL (ref 82–98)
MONOCYTES # BLD AUTO: 0.7 THOUSAND/ÂΜL (ref 0.17–1.22)
MONOCYTES NFR BLD AUTO: 9 % (ref 4–12)
NEUTROPHILS # BLD AUTO: 5.7 THOUSANDS/ÂΜL (ref 1.85–7.62)
NEUTS SEG NFR BLD AUTO: 70 % (ref 43–75)
NRBC BLD AUTO-RTO: 0 /100 WBCS
PLATELET # BLD AUTO: 637 THOUSANDS/UL (ref 149–390)
PMV BLD AUTO: 8.9 FL (ref 8.9–12.7)
RBC # BLD AUTO: 4.52 MILLION/UL (ref 3.81–5.12)
WBC # BLD AUTO: 8.14 THOUSAND/UL (ref 4.31–10.16)

## 2025-06-02 PROCEDURE — 36415 COLL VENOUS BLD VENIPUNCTURE: CPT

## 2025-06-02 PROCEDURE — 85025 COMPLETE CBC W/AUTO DIFF WBC: CPT

## 2025-06-03 ENCOUNTER — TELEPHONE (OUTPATIENT)
Dept: INTERNAL MEDICINE CLINIC | Facility: CLINIC | Age: 75
End: 2025-06-03

## 2025-06-03 ENCOUNTER — OFFICE VISIT (OUTPATIENT)
Dept: INTERNAL MEDICINE CLINIC | Facility: CLINIC | Age: 75
End: 2025-06-03
Payer: MEDICARE

## 2025-06-03 VITALS
HEIGHT: 64 IN | DIASTOLIC BLOOD PRESSURE: 84 MMHG | SYSTOLIC BLOOD PRESSURE: 124 MMHG | WEIGHT: 144 LBS | BODY MASS INDEX: 24.59 KG/M2 | OXYGEN SATURATION: 96 % | HEART RATE: 82 BPM | RESPIRATION RATE: 18 BRPM

## 2025-06-03 DIAGNOSIS — D64.9 ACUTE ANEMIA: Primary | ICD-10-CM

## 2025-06-03 DIAGNOSIS — D75.839 THROMBOCYTOSIS: ICD-10-CM

## 2025-06-03 DIAGNOSIS — H35.3211 EXUDATIVE AGE-RELATED MACULAR DEGENERATION OF RIGHT EYE WITH ACTIVE CHOROIDAL NEOVASCULARIZATION (HCC): ICD-10-CM

## 2025-06-03 DIAGNOSIS — D50.9 IRON DEFICIENCY ANEMIA, UNSPECIFIED IRON DEFICIENCY ANEMIA TYPE: ICD-10-CM

## 2025-06-03 PROCEDURE — 99496 TRANSJ CARE MGMT HIGH F2F 7D: CPT | Performed by: INTERNAL MEDICINE

## 2025-06-03 NOTE — ASSESSMENT & PLAN NOTE
Bre is here for TCM visit, she was admitted to the hospital with fatigue and exhaustion, found to have a hemoglobin of 4.9. She received a total of 3 units of PRBC and initiated on IV Veno. Seen by GI and she underwent both EGD/Colonoscopy.

## 2025-06-03 NOTE — ASSESSMENT & PLAN NOTE
Follows with ophthalmology team and receives injection. She is receiving Syfovre and abyasmo injections.

## 2025-06-03 NOTE — ASSESSMENT & PLAN NOTE
Bre is here for TCM visit, she was admitted to the hospital with fatigue and exhaustion, found to have a hemoglobin of 4.9. She received a total of 3 units of PRBC and initiated on IV Veno. Seen by GI and she underwent both EGD/Colonoscopy.     Colonoscopy revealing extensive diverticulosis but otherwise normal. EGD normal with exception of large hiatal hernia. Planned for small capsule endoscopy with results still pending-- unfortunately, it may have to be repeated, awaiting call from gastro.     If unremarkable, plan will be to see hematology.  For now continue oral iron.  Check CBC weekly for the next 4 weeks.  CBC done yesterday and looks good.    Orders:    CBC and differential; Standing

## 2025-06-03 NOTE — TELEPHONE ENCOUNTER
Braden Bernal MD  [facility]  Let pt know they should hear from GI    They will need to do the scan again, sorry to say!!    Pt advised.

## 2025-06-03 NOTE — PROGRESS NOTES
Transition of Care Visit:  Name: Bre Staton      : 1950      MRN: [id number]  Encounter Provider: Braden Bernal MD  Encounter Date: 6/3/2025   Encounter department: [facility] [place]    Assessment & Plan  Iron deficiency anemia, unspecified iron deficiency anemia type  Bre is here for TCM visit, she was admitted to the hospital with fatigue and exhaustion, found to have a hemoglobin of 4.9. She received a total of 3 units of PRBC and initiated on IV Veno. Seen by GI and she underwent both EGD/Colonoscopy.     Colonoscopy revealing extensive diverticulosis but otherwise normal. EGD normal with exception of large hiatal hernia. Planned for small capsule endoscopy with results still pending-- unfortunately, it may have to be repeated, awaiting call from gastro.     If unremarkable, plan will be to see hematology.  For now continue oral iron.  Check CBC weekly for the next 4 weeks.  CBC done yesterday and looks good.    Orders:    CBC and differential; Standing    Acute anemia  Bre is here for TCM visit, she was admitted to the hospital with fatigue and exhaustion, found to have a hemoglobin of 4.9. She received a total of 3 units of PRBC and initiated on IV Veno. Seen by GI and she underwent both EGD/Colonoscopy.          Exudative age-related macular degeneration of right eye with active choroidal neovascularization (HCC)  Follows with ophthalmology team and receives injection. She is receiving Syfovre and abyasmo injections.         Thrombocytosis  Physiological response to MATILDE, reassured pt.            History of Present Illness     Transitional Care Management Review:   Bre Staton is a 75 y.o. female here for TCM follow up.     During the TCM phone call patient stated:  TCM Call (since 2025)       Date and time call was made  2025  7:05 AM    Hospital care reviewed  Records reviewed    Patient was hospitialized at  [facility]    Date of Admission   "05/21/25    Date of discharge  05/24/25    Diagnosis  Acute anemia    Disposition  Home    Were the patients medications reviewed and updated  Yes    Current Symptoms  Weakness; Fatigue          TCM Call (since 5/20/2025)       Post hospital issues  Reduced activity    Scheduled for follow up?  Yes    Did you obtain your prescribed medications  Yes    Do you need help managing your prescriptions or medications  No    Is transportation to your appointment needed  No    I have advised the patient to call PCP with any new or worsening symptoms  RACHAEL Caban    Living Arrangements  Spouse or Significiant other    Support System  Family    The type of support provided  Emotional; Financial; Physical    Do you have social support  Yes, as much as I need    Are you recieving home care services  No          Here for TCM visit; we reviewed  recent hospitalization, dc summary and dc instructions.      Review of Systems   Constitutional:  Negative for chills and fever.   HENT:  Negative for ear pain and sore throat.    Eyes:  Negative for pain and visual disturbance.   Respiratory:  Negative for cough and shortness of breath.    Cardiovascular:  Negative for chest pain and palpitations.   Gastrointestinal:  Negative for abdominal pain and vomiting.   Genitourinary:  Negative for dysuria and hematuria.   Musculoskeletal:  Negative for arthralgias and back pain.   Skin:  Negative for color change and rash.   Neurological:  Negative for seizures and syncope.   All other systems reviewed and are negative.    Objective   /84 (BP Location: Left arm, Patient Position: Sitting, Cuff Size: Adult)   Pulse 82   Resp 18   Ht 5' 4\" (1.626 m)   Wt 65.3 kg (144 lb)   SpO2 96%   BMI 24.72 kg/m²     Physical Exam  Vitals and nursing note reviewed.   Constitutional:       General: She is not in acute distress.     Appearance: She is well-developed.   HENT:      Head: Normocephalic and atraumatic.     Eyes:      Conjunctiva/sclera: " Conjunctivae normal.       Cardiovascular:      Rate and Rhythm: Normal rate and regular rhythm.      Heart sounds: No murmur heard.  Pulmonary:      Effort: Pulmonary effort is normal. No respiratory distress.      Breath sounds: Normal breath sounds.   Abdominal:      Palpations: Abdomen is soft.      Tenderness: There is no abdominal tenderness.     Musculoskeletal:         General: No swelling.      Cervical back: Neck supple.     Skin:     General: Skin is warm and dry.      Capillary Refill: Capillary refill takes less than 2 seconds.     Neurological:      Mental Status: She is alert.     Psychiatric:         Mood and Affect: Mood normal.       Medications have been reviewed by provider in current encounter

## 2025-06-04 ENCOUNTER — TELEPHONE (OUTPATIENT)
Dept: GASTROENTEROLOGY | Facility: CLINIC | Age: 75
End: 2025-06-04

## 2025-06-04 ENCOUNTER — PREP FOR PROCEDURE (OUTPATIENT)
Dept: GASTROENTEROLOGY | Facility: CLINIC | Age: 75
End: 2025-06-04

## 2025-06-04 DIAGNOSIS — D50.9 IRON DEFICIENCY ANEMIA, UNSPECIFIED IRON DEFICIENCY ANEMIA TYPE: Primary | ICD-10-CM

## 2025-06-04 NOTE — TELEPHONE ENCOUNTER
Called and scheduled a repeat VCE with Bre and her son  6/12/25 7:40am  & 3:40pm [place]  Reviewed prep Instructions  patient is going to have ALL Clear Liquids the day before Capsule

## 2025-06-04 NOTE — TELEPHONE ENCOUNTER
----- Message from Jp Van DO sent at 6/3/2025  3:11 PM EDT -----  Regarding: RE: follow up endoscopy  Unfortunately, The VCE study isn't of sound quality.   Food debris obscured the small bowel shortly after one hour, in addition to the fact that the unit unplugged for a brief period of time. Fady, please call the patient and tell her that the quality of the whole study was poor.    It should be repeated with the bowel prep.  ----- Message -----  From: Braden Bernal MD  Sent: 6/3/2025   3:10 PM EDT  To: Jp Van DO  Subject: follow up endoscopy                              Hi Dr. Mccormick,Hope you are doing well,  I just met with Bre and her .  Discussed with them that results of the small bowel endoscopy are still pending.  They are open to the possibility of having to do the test again.  They wanted me to let you know.

## 2025-06-05 NOTE — PROGRESS NOTES
Due to the incomplete ultrasound with elastography, we will be repeating the ultrasound with elastography.    The video capsule endoscopy study was performed, however, the wire became detached after the patient left the office and she states that she plugged it back in within minutes.  The study, however, only showed 41 minutes of small bowel activity.  This is far too rapid of a transit raising the series question about the technical value of the study.  I am recommending that we repeat the study.  This brief study was interpreted and no sites for bleeding were identified.        Capsule endoscopy     Date/Time  5/29/2025 7:40 AM     Performed by  Jp Van DO   Authorized by  Jp Van DO

## 2025-06-06 PROCEDURE — 91110 GI TRC IMG INTRAL ESOPH-ILE: CPT | Performed by: INTERNAL MEDICINE

## 2025-06-09 ENCOUNTER — APPOINTMENT (OUTPATIENT)
Dept: LAB | Facility: HOSPITAL | Age: 75
End: 2025-06-09
Payer: MEDICARE

## 2025-06-09 ENCOUNTER — HOSPITAL ENCOUNTER (OUTPATIENT)
Dept: NON INVASIVE DIAGNOSTICS | Facility: HOSPITAL | Age: 75
Discharge: HOME | End: 2025-06-09
Attending: INTERNAL MEDICINE
Payer: MEDICARE

## 2025-06-09 VITALS
DIASTOLIC BLOOD PRESSURE: 84 MMHG | SYSTOLIC BLOOD PRESSURE: 124 MMHG | WEIGHT: 144 LBS | BODY MASS INDEX: 24.59 KG/M2 | HEART RATE: 70 BPM | HEIGHT: 64 IN

## 2025-06-09 DIAGNOSIS — D50.9 IRON DEFICIENCY ANEMIA, UNSPECIFIED IRON DEFICIENCY ANEMIA TYPE: ICD-10-CM

## 2025-06-09 DIAGNOSIS — R06.09 DOE (DYSPNEA ON EXERTION): ICD-10-CM

## 2025-06-09 LAB
AORTIC ROOT: 3 CM
BASOPHILS # BLD AUTO: 0.11 THOUSANDS/ÂΜL (ref 0–0.1)
BASOPHILS NFR BLD AUTO: 2 % (ref 0–1)
BSA FOR ECHO PROCEDURE: 1.7 M2
DOP CALC LVOT AREA: 3.14 CM2
DOP CALC LVOT DIAMETER: 2 CM
E WAVE DECELERATION TIME: 233 MS
E/A RATIO: 0.73
EOSINOPHIL # BLD AUTO: 0.18 THOUSAND/ÂΜL (ref 0–0.61)
EOSINOPHIL NFR BLD AUTO: 2 % (ref 0–6)
ERYTHROCYTE [DISTWIDTH] IN BLOOD BY AUTOMATED COUNT: 28.8 % (ref 11.6–15.1)
FRACTIONAL SHORTENING: 33 (ref 28–44)
HCT VFR BLD AUTO: 36.8 % (ref 34.8–46.1)
HGB BLD-MCNC: 11.2 G/DL (ref 11.5–15.4)
IMM GRANULOCYTES # BLD AUTO: 0.03 THOUSAND/UL (ref 0–0.2)
IMM GRANULOCYTES NFR BLD AUTO: 0 % (ref 0–2)
INTERVENTRICULAR SEPTUM IN DIASTOLE (PARASTERNAL SHORT AXIS VIEW): 1 CM
INTERVENTRICULAR SEPTUM: 1 CM (ref 0.6–1.1)
LAAS-AP2: 22 CM2
LAAS-AP4: 15.5 CM2
LEFT ATRIUM SIZE: 3.1 CM
LEFT ATRIUM VOLUME (MOD BIPLANE): 60 ML
LEFT ATRIUM VOLUME INDEX (MOD BIPLANE): 35.3 ML/M2
LEFT INTERNAL DIMENSION IN SYSTOLE: 2.6 CM (ref 2.1–4)
LEFT VENTRICULAR INTERNAL DIMENSION IN DIASTOLE: 3.9 CM (ref 3.5–6)
LEFT VENTRICULAR POSTERIOR WALL IN END DIASTOLE: 0.9 CM
LEFT VENTRICULAR STROKE VOLUME: 44 ML
LV EF US.2D.A4C+ESTIMATED: 56 %
LVSV (TEICH): 44 ML
LYMPHOCYTES # BLD AUTO: 1.3 THOUSANDS/ÂΜL (ref 0.6–4.47)
LYMPHOCYTES NFR BLD AUTO: 18 % (ref 14–44)
MCH RBC QN AUTO: 26.4 PG (ref 26.8–34.3)
MCHC RBC AUTO-ENTMCNC: 30.4 G/DL (ref 31.4–37.4)
MCV RBC AUTO: 87 FL (ref 82–98)
MONOCYTES # BLD AUTO: 0.59 THOUSAND/ÂΜL (ref 0.17–1.22)
MONOCYTES NFR BLD AUTO: 8 % (ref 4–12)
MV E'TISSUE VEL-LAT: 5 CM/S
MV E'TISSUE VEL-SEP: 8 CM/S
MV PEAK A VEL: 1.06 M/S
MV PEAK E VEL: 77 CM/S
NEUTROPHILS # BLD AUTO: 5.15 THOUSANDS/ÂΜL (ref 1.85–7.62)
NEUTS SEG NFR BLD AUTO: 70 % (ref 43–75)
NRBC BLD AUTO-RTO: 0 /100 WBCS
PLATELET # BLD AUTO: 507 THOUSANDS/UL (ref 149–390)
PMV BLD AUTO: 8.5 FL (ref 8.9–12.7)
RBC # BLD AUTO: 4.24 MILLION/UL (ref 3.81–5.12)
RIGHT ATRIUM AREA SYSTOLE A4C: 13.1 CM2
RIGHT VENTRICLE ID DIMENSION: 2.7 CM
SL CV LEFT ATRIUM LENGTH A2C: 5.3 CM
SL CV LV EF: 60
SL CV PED ECHO LEFT VENTRICLE DIASTOLIC VOLUME (MOD BIPLANE) 2D: 68 ML
SL CV PED ECHO LEFT VENTRICLE SYSTOLIC VOLUME (MOD BIPLANE) 2D: 23 ML
TRICUSPID ANNULAR PLANE SYSTOLIC EXCURSION: 2 CM
WBC # BLD AUTO: 7.36 THOUSAND/UL (ref 4.31–10.16)

## 2025-06-09 PROCEDURE — 36415 COLL VENOUS BLD VENIPUNCTURE: CPT

## 2025-06-09 PROCEDURE — 85025 COMPLETE CBC W/AUTO DIFF WBC: CPT

## 2025-06-09 PROCEDURE — 93306 TTE W/DOPPLER COMPLETE: CPT | Performed by: INTERNAL MEDICINE

## 2025-06-09 PROCEDURE — 93306 TTE W/DOPPLER COMPLETE: CPT

## 2025-06-09 NOTE — TELEPHONE ENCOUNTER
Pt's , Alvaro, calling in with some additional questions about the VCE Thursday.  asking if pt has to do clears the whole day before or if she can have eggs in the morning. Advised that it is clears the whole day because there was residual food seen during the prior test. He verbalized understanding. Reviewed post-procedure recommendations as well and he verbalized understanding. No further needs at this time.

## 2025-06-12 ENCOUNTER — PROCEDURE VISIT (OUTPATIENT)
Dept: GASTROENTEROLOGY | Facility: CLINIC | Age: 75
End: 2025-06-12
Attending: INTERNAL MEDICINE

## 2025-06-12 ENCOUNTER — TELEPHONE (OUTPATIENT)
Age: 75
End: 2025-06-12

## 2025-06-12 DIAGNOSIS — D50.9 IRON DEFICIENCY ANEMIA, UNSPECIFIED IRON DEFICIENCY ANEMIA TYPE: ICD-10-CM

## 2025-06-12 PROCEDURE — 91110 GI TRC IMG INTRAL ESOPH-ILE: CPT | Performed by: INTERNAL MEDICINE

## 2025-06-12 NOTE — TELEPHONE ENCOUNTER
Pt. Spouse calling along with pt. Asking for confirmation if pictures downloaded correctly advise that the report was in process and I was unable to determine, pt. Asking to speak with Leigh at office, tried calling office and no answer, advised office was closed and would have someone from office reach out tomorrow when it re-opens, pt. Verbalized understanding

## 2025-06-12 NOTE — TELEPHONE ENCOUNTER
calling (consent verified) to verify can have coffee with sugar at 2 hour connor.  Advised that coffee is considered clear liquid and she may have.  No milk.     no ROM deficits were identified

## 2025-06-13 ENCOUNTER — TELEPHONE (OUTPATIENT)
Dept: GASTROENTEROLOGY | Facility: CLINIC | Age: 75
End: 2025-06-13

## 2025-06-13 ENCOUNTER — TELEPHONE (OUTPATIENT)
Dept: GASTROENTEROLOGY | Facility: CLINIC | Age: 75
End: 2025-06-13
Payer: MEDICARE

## 2025-06-13 PROCEDURE — PBNCHG PB NO CHARGE PLACEHOLDER: Performed by: INTERNAL MEDICINE

## 2025-06-13 NOTE — H&P
The video capsule endoscopy study was viewed and report has been completed.  The patient has been notified of the results.

## 2025-06-13 NOTE — TELEPHONE ENCOUNTER
I spoke directly with patient.   Pictures did download. She is aware Dr will get back to her once he has reviewed with his findings

## 2025-06-13 NOTE — TELEPHONE ENCOUNTER
"Called pt and relayed Pill cam (vce) results.  As per Dr. Van, \" this is a normal CVCE study. The GI workup is now complete. We should see tonie in the office zhao the next 6-8 weeks for a routine follow up. No additional workup is required at this time.\"    Pt voiced understanding an asked if we can call her on Monday morning to schedule the F/U when her  is at home.    Routing to clerical to schedule the F/U   Thank you!  "

## 2025-06-13 NOTE — TELEPHONE ENCOUNTER
Bre came to the office to repeat her video capsule endoscopy.  Her previous video capsule endoscopy was technically deficient.  The study has been reviewed and a formal report has been created.  There was no evidence of any bleeding sites within the small intestine.        Capsule endoscopy     Date/Time  6/13/2025 12:00 AM     Performed by  Jp Van DO   Authorized by  Jp Van DO

## 2025-06-13 NOTE — PROGRESS NOTES
The video capsule endoscopy study has been performed and a formal report has been completed.  The patient was notified of the results.

## 2025-06-16 ENCOUNTER — TELEPHONE (OUTPATIENT)
Dept: GASTROENTEROLOGY | Facility: CLINIC | Age: 75
End: 2025-06-16

## 2025-06-16 ENCOUNTER — APPOINTMENT (OUTPATIENT)
Dept: LAB | Facility: HOSPITAL | Age: 75
End: 2025-06-16
Payer: MEDICARE

## 2025-06-16 DIAGNOSIS — D50.9 IRON DEFICIENCY ANEMIA, UNSPECIFIED IRON DEFICIENCY ANEMIA TYPE: ICD-10-CM

## 2025-06-16 LAB
BASOPHILS # BLD AUTO: 0.06 THOUSANDS/ÂΜL (ref 0–0.1)
BASOPHILS NFR BLD AUTO: 1 % (ref 0–1)
EOSINOPHIL # BLD AUTO: 0.15 THOUSAND/ÂΜL (ref 0–0.61)
EOSINOPHIL NFR BLD AUTO: 2 % (ref 0–6)
ERYTHROCYTE [DISTWIDTH] IN BLOOD BY AUTOMATED COUNT: 29.9 % (ref 11.6–15.1)
HCT VFR BLD AUTO: 37.8 % (ref 34.8–46.1)
HGB BLD-MCNC: 11.6 G/DL (ref 11.5–15.4)
IMM GRANULOCYTES # BLD AUTO: 0.03 THOUSAND/UL (ref 0–0.2)
IMM GRANULOCYTES NFR BLD AUTO: 0 % (ref 0–2)
LYMPHOCYTES # BLD AUTO: 0.98 THOUSANDS/ÂΜL (ref 0.6–4.47)
LYMPHOCYTES NFR BLD AUTO: 14 % (ref 14–44)
MCH RBC QN AUTO: 27.5 PG (ref 26.8–34.3)
MCHC RBC AUTO-ENTMCNC: 30.7 G/DL (ref 31.4–37.4)
MCV RBC AUTO: 90 FL (ref 82–98)
MONOCYTES # BLD AUTO: 0.7 THOUSAND/ÂΜL (ref 0.17–1.22)
MONOCYTES NFR BLD AUTO: 10 % (ref 4–12)
NEUTROPHILS # BLD AUTO: 4.95 THOUSANDS/ÂΜL (ref 1.85–7.62)
NEUTS SEG NFR BLD AUTO: 73 % (ref 43–75)
NRBC BLD AUTO-RTO: 0 /100 WBCS
PLATELET # BLD AUTO: 314 THOUSANDS/UL (ref 149–390)
PMV BLD AUTO: 8.2 FL (ref 8.9–12.7)
RBC # BLD AUTO: 4.22 MILLION/UL (ref 3.81–5.12)
WBC # BLD AUTO: 6.87 THOUSAND/UL (ref 4.31–10.16)

## 2025-06-16 PROCEDURE — 36415 COLL VENOUS BLD VENIPUNCTURE: CPT

## 2025-06-16 PROCEDURE — 85025 COMPLETE CBC W/AUTO DIFF WBC: CPT

## 2025-06-16 NOTE — TELEPHONE ENCOUNTER
Please add patient to Dr. Van's urgent appt slot on Thursday July 31 @ 3:20.  Fup appt.   Patient aware of the appt.  Thank you

## 2025-06-18 ENCOUNTER — HOSPITAL ENCOUNTER (OUTPATIENT)
Age: 75
Discharge: HOME | End: 2025-06-18
Attending: INTERNAL MEDICINE
Payer: MEDICARE

## 2025-06-18 VITALS — HEIGHT: 62 IN | BODY MASS INDEX: 26.13 KG/M2

## 2025-06-18 DIAGNOSIS — Z78.0 POST-MENOPAUSAL: ICD-10-CM

## 2025-06-18 PROCEDURE — 77080 DXA BONE DENSITY AXIAL: CPT

## 2025-06-24 ENCOUNTER — APPOINTMENT (OUTPATIENT)
Dept: LAB | Facility: HOSPITAL | Age: 75
End: 2025-06-24
Attending: INTERNAL MEDICINE
Payer: MEDICARE

## 2025-06-24 DIAGNOSIS — D50.9 IRON DEFICIENCY ANEMIA, UNSPECIFIED IRON DEFICIENCY ANEMIA TYPE: ICD-10-CM

## 2025-06-24 LAB
BASOPHILS # BLD AUTO: 0.07 THOUSANDS/ÂΜL (ref 0–0.1)
BASOPHILS NFR BLD AUTO: 1 % (ref 0–1)
EOSINOPHIL # BLD AUTO: 0.18 THOUSAND/ÂΜL (ref 0–0.61)
EOSINOPHIL NFR BLD AUTO: 2 % (ref 0–6)
HCT VFR BLD AUTO: 36.3 % (ref 34.8–46.1)
HGB BLD-MCNC: 11.6 G/DL (ref 11.5–15.4)
IMM GRANULOCYTES # BLD AUTO: 0.02 THOUSAND/UL (ref 0–0.2)
IMM GRANULOCYTES NFR BLD AUTO: 0 % (ref 0–2)
LYMPHOCYTES # BLD AUTO: 1.05 THOUSANDS/ÂΜL (ref 0.6–4.47)
LYMPHOCYTES NFR BLD AUTO: 13 % (ref 14–44)
MCH RBC QN AUTO: 28.9 PG (ref 26.8–34.3)
MCHC RBC AUTO-ENTMCNC: 32 G/DL (ref 31.4–37.4)
MCV RBC AUTO: 90 FL (ref 82–98)
MONOCYTES # BLD AUTO: 0.79 THOUSAND/ÂΜL (ref 0.17–1.22)
MONOCYTES NFR BLD AUTO: 10 % (ref 4–12)
NEUTROPHILS # BLD AUTO: 5.92 THOUSANDS/ÂΜL (ref 1.85–7.62)
NEUTS SEG NFR BLD AUTO: 74 % (ref 43–75)
NRBC BLD AUTO-RTO: 0 /100 WBCS
PLATELET # BLD AUTO: 282 THOUSANDS/UL (ref 149–390)
PMV BLD AUTO: 8.5 FL (ref 8.9–12.7)
RBC # BLD AUTO: 4.02 MILLION/UL (ref 3.81–5.12)
WBC # BLD AUTO: 8.03 THOUSAND/UL (ref 4.31–10.16)

## 2025-06-24 PROCEDURE — 36415 COLL VENOUS BLD VENIPUNCTURE: CPT

## 2025-06-24 PROCEDURE — 85025 COMPLETE CBC W/AUTO DIFF WBC: CPT

## 2025-07-08 ENCOUNTER — APPOINTMENT (OUTPATIENT)
Dept: LAB | Facility: HOSPITAL | Age: 75
End: 2025-07-08
Payer: MEDICARE

## 2025-07-08 DIAGNOSIS — D50.9 IRON DEFICIENCY ANEMIA, UNSPECIFIED IRON DEFICIENCY ANEMIA TYPE: ICD-10-CM

## 2025-07-08 LAB
BASOPHILS # BLD AUTO: 0.1 THOUSANDS/ÂΜL (ref 0–0.1)
BASOPHILS NFR BLD AUTO: 1 % (ref 0–1)
EOSINOPHIL # BLD AUTO: 0.19 THOUSAND/ÂΜL (ref 0–0.61)
EOSINOPHIL NFR BLD AUTO: 3 % (ref 0–6)
ERYTHROCYTE [DISTWIDTH] IN BLOOD BY AUTOMATED COUNT: 27.7 % (ref 11.6–15.1)
HCT VFR BLD AUTO: 39.6 % (ref 34.8–46.1)
HGB BLD-MCNC: 12.7 G/DL (ref 11.5–15.4)
IMM GRANULOCYTES # BLD AUTO: 0.02 THOUSAND/UL (ref 0–0.2)
IMM GRANULOCYTES NFR BLD AUTO: 0 % (ref 0–2)
LYMPHOCYTES # BLD AUTO: 0.98 THOUSANDS/ÂΜL (ref 0.6–4.47)
LYMPHOCYTES NFR BLD AUTO: 14 % (ref 14–44)
MCH RBC QN AUTO: 30.2 PG (ref 26.8–34.3)
MCHC RBC AUTO-ENTMCNC: 32.1 G/DL (ref 31.4–37.4)
MCV RBC AUTO: 94 FL (ref 82–98)
MONOCYTES # BLD AUTO: 0.69 THOUSAND/ÂΜL (ref 0.17–1.22)
MONOCYTES NFR BLD AUTO: 10 % (ref 4–12)
NEUTROPHILS # BLD AUTO: 5.28 THOUSANDS/ÂΜL (ref 1.85–7.62)
NEUTS SEG NFR BLD AUTO: 72 % (ref 43–75)
NRBC BLD AUTO-RTO: 0 /100 WBCS
PLATELET # BLD AUTO: 317 THOUSANDS/UL (ref 149–390)
PMV BLD AUTO: 8.4 FL (ref 8.9–12.7)
RBC # BLD AUTO: 4.21 MILLION/UL (ref 3.81–5.12)
WBC # BLD AUTO: 7.26 THOUSAND/UL (ref 4.31–10.16)

## 2025-07-08 PROCEDURE — 85025 COMPLETE CBC W/AUTO DIFF WBC: CPT

## 2025-07-08 PROCEDURE — 36415 COLL VENOUS BLD VENIPUNCTURE: CPT

## 2025-07-11 ENCOUNTER — E-CONSULT (OUTPATIENT)
Dept: HEMATOLOGY ONCOLOGY | Facility: CLINIC | Age: 75
End: 2025-07-11
Payer: MEDICARE

## 2025-07-11 DIAGNOSIS — D64.9 ACUTE ANEMIA: Primary | ICD-10-CM

## 2025-07-11 DIAGNOSIS — D50.0 IRON DEFICIENCY ANEMIA DUE TO CHRONIC BLOOD LOSS: Primary | ICD-10-CM

## 2025-07-11 DIAGNOSIS — D50.9 IRON DEFICIENCY ANEMIA, UNSPECIFIED IRON DEFICIENCY ANEMIA TYPE: ICD-10-CM

## 2025-07-11 PROCEDURE — 99451 NTRPROF PH1/NTRNET/EHR 5/>: CPT | Performed by: PHYSICIAN ASSISTANT

## 2025-07-11 NOTE — PROGRESS NOTES
E-Consult  Bre Staton 75 y.o. female MRN: [id number]  Encounter Date: 07/11/25      Reason for Consult / Principal Problem: Anemia     Consulting Provider: Padmini Kaye PA-C    Requesting Provider: Braden Bernal MD       ASSESSMENT:  75-year-old female with past medical history of asthma, dyslipidemia, hypertension who has been referred by PCP for iron deficiency anemia.     In May, patient developed anemia with hgb 4.9. Iron level undetectable, ferritin was 7. She was hospitalized and underwent GI work up with EGD/colonoscopy, capsule endoscopy did not show any active bleeding. Received 3U PRBC and 900mg total IV Venofer. Prior to this hgb was normal. Hemolysis labs negative. B12 was 330. She has history of iron deficiency. When she was discharged hgb improved to 9-10g/dL range.     Most recent CBC from 07/08/2025 shows hemoglobin 12.7, MCV 94, platelets 317,000. There has been no repeat iron panel.     RECOMMENDATIONS:  Suspect MATILDE secondary to bleed that has stopped prior to GI evaluation. MATILDE is now resolved after Iron replacement.   Check iron panel with ferritin   Continue Fergon 240mg, can decrease to QOD. Hold if ferritin >150.   B12 level previously borderline, can consider cyanocobalamin 500 mcg daily with repeat B12 in 3 months  Monitor CBC closely.  Repeat CBC in 1 to 3 months.   For recurrent MATILDE, consider additional IV iron replacement and referral for in person hematology consultation for consideration of maintenance IV iron      Total time spent >5 min, >50% time spent reviewing/analyzing record, written report will be generated in the EMR. .

## 2025-07-14 ENCOUNTER — TELEPHONE (OUTPATIENT)
Dept: INTERNAL MEDICINE CLINIC | Facility: CLINIC | Age: 75
End: 2025-07-14

## 2025-07-14 DIAGNOSIS — D50.9 IRON DEFICIENCY ANEMIA, UNSPECIFIED IRON DEFICIENCY ANEMIA TYPE: Primary | ICD-10-CM

## 2025-07-14 NOTE — TELEPHONE ENCOUNTER
----- Message from Braden Bernal MD sent at 7/14/2025  7:53 AM EDT -----  Regarding: please let pt know results of e consult  I discussed with tonie that I would be consulting hematology for help with her case.  They are thinking she had an iron deficiency anemia secondary to some kind of GI bleed that stopped prior to her GI evaluation.    For now we should check a repeat iron panel which I ordered today.    She should continue the iron replacement 240 mg, she could start taking it every other day.  And we will monitor her CBC every 3 weeks with an iron panel. Repeat cbc in 3 weeks    She should also begin vitamin B12 replacement therapy, 500 mg daily for 3 months.

## 2025-07-14 NOTE — PROGRESS NOTES
I discussed with tonie that I would be consulting hematology for help with her case.  They are thinking she had an iron deficiency anemia secondary to some kind of GI bleed that stopped prior to her GI evaluation.    For now we should check a repeat iron panel which I ordered today.    She should continue the iron replacement 240 mg, she could start taking it every other day.  And we will monitor her CBC every 3 weeks with an iron panel. Repeat cbc in 3 weeks    She should also begin vitamin B12 replacement therapy, 500 mg daily for 3 months.

## 2025-08-04 ENCOUNTER — APPOINTMENT (OUTPATIENT)
Dept: LAB | Facility: HOSPITAL | Age: 75
End: 2025-08-04
Payer: MEDICARE

## 2025-08-04 DIAGNOSIS — D50.9 IRON DEFICIENCY ANEMIA, UNSPECIFIED IRON DEFICIENCY ANEMIA TYPE: ICD-10-CM

## 2025-08-04 LAB
FERRITIN SERPL-MCNC: 62 NG/ML (ref 30–307)
IRON SATN MFR SERPL: 70 % (ref 15–50)
IRON SERPL-MCNC: 260 UG/DL (ref 50–212)
TIBC SERPL-MCNC: 371 UG/DL (ref 250–450)
TRANSFERRIN SERPL-MCNC: 265 MG/DL (ref 203–362)
UIBC SERPL-MCNC: 111 UG/DL (ref 155–355)

## 2025-08-04 PROCEDURE — 83540 ASSAY OF IRON: CPT

## 2025-08-04 PROCEDURE — 36415 COLL VENOUS BLD VENIPUNCTURE: CPT

## 2025-08-04 PROCEDURE — 82728 ASSAY OF FERRITIN: CPT

## 2025-08-04 PROCEDURE — 83550 IRON BINDING TEST: CPT

## 2025-08-07 DIAGNOSIS — D64.9 ACUTE ANEMIA: Primary | ICD-10-CM

## 2025-08-12 ENCOUNTER — DOCUMENTATION (OUTPATIENT)
Dept: INTERNAL MEDICINE CLINIC | Facility: CLINIC | Age: 75
End: 2025-08-12

## 2025-08-14 ENCOUNTER — PATIENT MESSAGE (OUTPATIENT)
Dept: INTERNAL MEDICINE CLINIC | Facility: CLINIC | Age: 75
End: 2025-08-14

## 2025-08-15 ENCOUNTER — APPOINTMENT (OUTPATIENT)
Dept: LAB | Facility: HOSPITAL | Age: 75
End: 2025-08-15
Payer: MEDICARE